# Patient Record
Sex: FEMALE | Race: WHITE | NOT HISPANIC OR LATINO | Employment: OTHER | URBAN - METROPOLITAN AREA
[De-identification: names, ages, dates, MRNs, and addresses within clinical notes are randomized per-mention and may not be internally consistent; named-entity substitution may affect disease eponyms.]

---

## 2017-04-12 ENCOUNTER — HOSPITAL ENCOUNTER (OUTPATIENT)
Dept: RADIOLOGY | Facility: MEDICAL CENTER | Age: 72
End: 2017-04-12
Attending: NURSE PRACTITIONER
Payer: MEDICARE

## 2017-04-12 DIAGNOSIS — J01.90 ACUTE SINUSITIS, UNSPECIFIED: ICD-10-CM

## 2017-04-12 DIAGNOSIS — R05.9 COUGH: ICD-10-CM

## 2017-04-12 PROCEDURE — 70220 X-RAY EXAM OF SINUSES: CPT

## 2017-04-12 PROCEDURE — 71020 DX-CHEST-2 VIEWS: CPT

## 2017-09-11 ENCOUNTER — HOSPITAL ENCOUNTER (OUTPATIENT)
Facility: MEDICAL CENTER | Age: 72
End: 2017-09-12
Attending: EMERGENCY MEDICINE | Admitting: HOSPITALIST
Payer: MEDICARE

## 2017-09-11 ENCOUNTER — APPOINTMENT (OUTPATIENT)
Dept: RADIOLOGY | Facility: MEDICAL CENTER | Age: 72
End: 2017-09-11
Attending: EMERGENCY MEDICINE
Payer: MEDICARE

## 2017-09-11 ENCOUNTER — RESOLUTE PROFESSIONAL BILLING HOSPITAL PROF FEE (OUTPATIENT)
Dept: HOSPITALIST | Facility: MEDICAL CENTER | Age: 72
End: 2017-09-11
Payer: MEDICARE

## 2017-09-11 DIAGNOSIS — R61 DIAPHORESIS: ICD-10-CM

## 2017-09-11 DIAGNOSIS — R11.2 NAUSEA AND VOMITING, INTRACTABILITY OF VOMITING NOT SPECIFIED, UNSPECIFIED VOMITING TYPE: ICD-10-CM

## 2017-09-11 PROBLEM — N18.30 CKD (CHRONIC KIDNEY DISEASE) STAGE 3, GFR 30-59 ML/MIN (HCC): Status: ACTIVE | Noted: 2017-09-11

## 2017-09-11 PROBLEM — R11.10 EMESIS: Status: ACTIVE | Noted: 2017-09-11

## 2017-09-11 PROBLEM — M54.6 LEFT-SIDED THORACIC BACK PAIN: Status: ACTIVE | Noted: 2017-09-11

## 2017-09-11 PROBLEM — G47.00 INSOMNIA: Status: ACTIVE | Noted: 2017-09-11

## 2017-09-11 LAB
ALBUMIN SERPL BCP-MCNC: 4.4 G/DL (ref 3.2–4.9)
ALBUMIN/GLOB SERPL: 1.7 G/DL
ALP SERPL-CCNC: 62 U/L (ref 30–99)
ALT SERPL-CCNC: 12 U/L (ref 2–50)
ANION GAP SERPL CALC-SCNC: 12 MMOL/L (ref 0–11.9)
APPEARANCE UR: CLEAR
AST SERPL-CCNC: 18 U/L (ref 12–45)
BACTERIA #/AREA URNS HPF: ABNORMAL /HPF
BASOPHILS # BLD AUTO: 0.1 % (ref 0–1.8)
BASOPHILS # BLD: 0.01 K/UL (ref 0–0.12)
BILIRUB SERPL-MCNC: 0.8 MG/DL (ref 0.1–1.5)
BILIRUB UR QL STRIP.AUTO: NEGATIVE
BUN SERPL-MCNC: 28 MG/DL (ref 8–22)
CALCIUM SERPL-MCNC: 9.6 MG/DL (ref 8.4–10.2)
CHLORIDE SERPL-SCNC: 106 MMOL/L (ref 96–112)
CK SERPL-CCNC: 55 U/L (ref 0–154)
CO2 SERPL-SCNC: 20 MMOL/L (ref 20–33)
COLOR UR: YELLOW
CREAT SERPL-MCNC: 1.46 MG/DL (ref 0.5–1.4)
EKG IMPRESSION: NORMAL
EOSINOPHIL # BLD AUTO: 0.01 K/UL (ref 0–0.51)
EOSINOPHIL NFR BLD: 0.1 % (ref 0–6.9)
EPI CELLS #/AREA URNS HPF: ABNORMAL /HPF
ERYTHROCYTE [DISTWIDTH] IN BLOOD BY AUTOMATED COUNT: 43.5 FL (ref 35.9–50)
GFR SERPL CREATININE-BSD FRML MDRD: 35 ML/MIN/1.73 M 2
GLOBULIN SER CALC-MCNC: 2.6 G/DL (ref 1.9–3.5)
GLUCOSE SERPL-MCNC: 127 MG/DL (ref 65–99)
GLUCOSE UR STRIP.AUTO-MCNC: NEGATIVE MG/DL
HCT VFR BLD AUTO: 36.9 % (ref 37–47)
HGB BLD-MCNC: 13 G/DL (ref 12–16)
IMM GRANULOCYTES # BLD AUTO: 0.05 K/UL (ref 0–0.11)
IMM GRANULOCYTES NFR BLD AUTO: 0.5 % (ref 0–0.9)
KETONES UR STRIP.AUTO-MCNC: ABNORMAL MG/DL
LEUKOCYTE ESTERASE UR QL STRIP.AUTO: NEGATIVE
LIPASE SERPL-CCNC: 48 U/L (ref 7–58)
LYMPHOCYTES # BLD AUTO: 0.62 K/UL (ref 1–4.8)
LYMPHOCYTES NFR BLD: 6.6 % (ref 22–41)
MCH RBC QN AUTO: 30.6 PG (ref 27–33)
MCHC RBC AUTO-ENTMCNC: 35.2 G/DL (ref 33.6–35)
MCV RBC AUTO: 86.8 FL (ref 81.4–97.8)
MICRO URNS: ABNORMAL
MONOCYTES # BLD AUTO: 0.13 K/UL (ref 0–0.85)
MONOCYTES NFR BLD AUTO: 1.4 % (ref 0–13.4)
MUCOUS THREADS #/AREA URNS HPF: ABNORMAL /HPF
NEUTROPHILS # BLD AUTO: 8.56 K/UL (ref 2–7.15)
NEUTROPHILS NFR BLD: 91.3 % (ref 44–72)
NITRITE UR QL STRIP.AUTO: NEGATIVE
NRBC # BLD AUTO: 0 K/UL
NRBC BLD AUTO-RTO: 0 /100 WBC
PH UR STRIP.AUTO: 6 [PH]
PLATELET # BLD AUTO: 170 K/UL (ref 164–446)
PMV BLD AUTO: 11.1 FL (ref 9–12.9)
POTASSIUM SERPL-SCNC: 4.1 MMOL/L (ref 3.6–5.5)
PROT SERPL-MCNC: 7 G/DL (ref 6–8.2)
PROT UR QL STRIP: 100 MG/DL
RBC # BLD AUTO: 4.25 M/UL (ref 4.2–5.4)
RBC # URNS HPF: ABNORMAL /HPF
RBC UR QL AUTO: NEGATIVE
SODIUM SERPL-SCNC: 138 MMOL/L (ref 135–145)
SP GR UR STRIP.AUTO: 1.02
TROPONIN I SERPL-MCNC: <0.02 NG/ML (ref 0–0.04)
WBC # BLD AUTO: 9.4 K/UL (ref 4.8–10.8)
WBC #/AREA URNS HPF: ABNORMAL /HPF

## 2017-09-11 PROCEDURE — 93005 ELECTROCARDIOGRAM TRACING: CPT | Performed by: EMERGENCY MEDICINE

## 2017-09-11 PROCEDURE — 700105 HCHG RX REV CODE 258: Performed by: EMERGENCY MEDICINE

## 2017-09-11 PROCEDURE — G0378 HOSPITAL OBSERVATION PER HR: HCPCS

## 2017-09-11 PROCEDURE — 80053 COMPREHEN METABOLIC PANEL: CPT

## 2017-09-11 PROCEDURE — 85025 COMPLETE CBC W/AUTO DIFF WBC: CPT

## 2017-09-11 PROCEDURE — 93005 ELECTROCARDIOGRAM TRACING: CPT | Performed by: HOSPITALIST

## 2017-09-11 PROCEDURE — 93010 ELECTROCARDIOGRAM REPORT: CPT | Performed by: INTERNAL MEDICINE

## 2017-09-11 PROCEDURE — 700102 HCHG RX REV CODE 250 W/ 637 OVERRIDE(OP): Performed by: EMERGENCY MEDICINE

## 2017-09-11 PROCEDURE — 99285 EMERGENCY DEPT VISIT HI MDM: CPT

## 2017-09-11 PROCEDURE — 96361 HYDRATE IV INFUSION ADD-ON: CPT

## 2017-09-11 PROCEDURE — 700111 HCHG RX REV CODE 636 W/ 250 OVERRIDE (IP): Performed by: EMERGENCY MEDICINE

## 2017-09-11 PROCEDURE — 96374 THER/PROPH/DIAG INJ IV PUSH: CPT

## 2017-09-11 PROCEDURE — 81001 URINALYSIS AUTO W/SCOPE: CPT

## 2017-09-11 PROCEDURE — 82550 ASSAY OF CK (CPK): CPT

## 2017-09-11 PROCEDURE — 36415 COLL VENOUS BLD VENIPUNCTURE: CPT

## 2017-09-11 PROCEDURE — 83690 ASSAY OF LIPASE: CPT

## 2017-09-11 PROCEDURE — 87086 URINE CULTURE/COLONY COUNT: CPT

## 2017-09-11 PROCEDURE — 93005 ELECTROCARDIOGRAM TRACING: CPT

## 2017-09-11 PROCEDURE — 99220 PR INITIAL OBSERVATION CARE,LEVL III: CPT | Performed by: HOSPITALIST

## 2017-09-11 PROCEDURE — 71250 CT THORAX DX C-: CPT

## 2017-09-11 PROCEDURE — 700105 HCHG RX REV CODE 258: Performed by: HOSPITALIST

## 2017-09-11 PROCEDURE — 84484 ASSAY OF TROPONIN QUANT: CPT | Mod: 91

## 2017-09-11 PROCEDURE — A9270 NON-COVERED ITEM OR SERVICE: HCPCS | Performed by: EMERGENCY MEDICINE

## 2017-09-11 RX ORDER — OMEPRAZOLE 20 MG/1
20 CAPSULE, DELAYED RELEASE ORAL DAILY
Status: ON HOLD | COMMUNITY
End: 2017-09-12

## 2017-09-11 RX ORDER — AMOXICILLIN 250 MG
2 CAPSULE ORAL 2 TIMES DAILY
Status: DISCONTINUED | OUTPATIENT
Start: 2017-09-11 | End: 2017-09-12 | Stop reason: HOSPADM

## 2017-09-11 RX ORDER — NITROGLYCERIN 0.4 MG/1
0.4 TABLET SUBLINGUAL
Status: DISCONTINUED | OUTPATIENT
Start: 2017-09-11 | End: 2017-09-12 | Stop reason: HOSPADM

## 2017-09-11 RX ORDER — MEDROXYPROGESTERONE ACETATE 2.5 MG/1
2.5 TABLET ORAL DAILY
COMMUNITY

## 2017-09-11 RX ORDER — ASPIRIN 600 MG/1
300 SUPPOSITORY RECTAL DAILY
Status: DISCONTINUED | OUTPATIENT
Start: 2017-09-11 | End: 2017-09-12 | Stop reason: HOSPADM

## 2017-09-11 RX ORDER — ASPIRIN 600 MG/1
300 SUPPOSITORY RECTAL ONCE
Status: COMPLETED | OUTPATIENT
Start: 2017-09-11 | End: 2017-09-11

## 2017-09-11 RX ORDER — LOSARTAN POTASSIUM 25 MG/1
100 TABLET ORAL DAILY
Status: DISCONTINUED | OUTPATIENT
Start: 2017-09-12 | End: 2017-09-12 | Stop reason: HOSPADM

## 2017-09-11 RX ORDER — ASPIRIN 81 MG/1
324 TABLET, CHEWABLE ORAL DAILY
Status: DISCONTINUED | OUTPATIENT
Start: 2017-09-11 | End: 2017-09-12 | Stop reason: HOSPADM

## 2017-09-11 RX ORDER — LORAZEPAM 1 MG/1
0.5 TABLET ORAL EVERY 6 HOURS PRN
Status: DISCONTINUED | OUTPATIENT
Start: 2017-09-11 | End: 2017-09-12 | Stop reason: HOSPADM

## 2017-09-11 RX ORDER — ESTRADIOL 0.5 MG/1
0.5 TABLET ORAL DAILY
COMMUNITY

## 2017-09-11 RX ORDER — ASPIRIN 81 MG/1
324 TABLET, CHEWABLE ORAL ONCE
Status: COMPLETED | OUTPATIENT
Start: 2017-09-11 | End: 2017-09-11

## 2017-09-11 RX ORDER — ONDANSETRON 2 MG/ML
4 INJECTION INTRAMUSCULAR; INTRAVENOUS ONCE
Status: COMPLETED | OUTPATIENT
Start: 2017-09-11 | End: 2017-09-11

## 2017-09-11 RX ORDER — LORAZEPAM 2 MG/ML
0.5 INJECTION INTRAMUSCULAR EVERY 6 HOURS PRN
Status: DISCONTINUED | OUTPATIENT
Start: 2017-09-11 | End: 2017-09-12 | Stop reason: HOSPADM

## 2017-09-11 RX ORDER — BISACODYL 10 MG
10 SUPPOSITORY, RECTAL RECTAL
Status: DISCONTINUED | OUTPATIENT
Start: 2017-09-11 | End: 2017-09-12 | Stop reason: HOSPADM

## 2017-09-11 RX ORDER — SODIUM CHLORIDE 9 MG/ML
INJECTION, SOLUTION INTRAVENOUS CONTINUOUS
Status: DISCONTINUED | OUTPATIENT
Start: 2017-09-11 | End: 2017-09-12 | Stop reason: HOSPADM

## 2017-09-11 RX ORDER — POLYETHYLENE GLYCOL 3350 17 G/17G
1 POWDER, FOR SOLUTION ORAL
Status: DISCONTINUED | OUTPATIENT
Start: 2017-09-11 | End: 2017-09-12 | Stop reason: HOSPADM

## 2017-09-11 RX ORDER — SODIUM CHLORIDE 9 MG/ML
1000 INJECTION, SOLUTION INTRAVENOUS ONCE
Status: COMPLETED | OUTPATIENT
Start: 2017-09-11 | End: 2017-09-11

## 2017-09-11 RX ORDER — ONDANSETRON 2 MG/ML
4 INJECTION INTRAMUSCULAR; INTRAVENOUS EVERY 4 HOURS PRN
Status: DISCONTINUED | OUTPATIENT
Start: 2017-09-11 | End: 2017-09-12 | Stop reason: HOSPADM

## 2017-09-11 RX ORDER — OXYCODONE AND ACETAMINOPHEN 10; 325 MG/1; MG/1
1-2 TABLET ORAL EVERY 4 HOURS PRN
COMMUNITY

## 2017-09-11 RX ORDER — ACETAMINOPHEN 325 MG/1
650 TABLET ORAL EVERY 6 HOURS PRN
Status: DISCONTINUED | OUTPATIENT
Start: 2017-09-11 | End: 2017-09-12 | Stop reason: HOSPADM

## 2017-09-11 RX ORDER — ASPIRIN 325 MG
325 TABLET ORAL DAILY
Status: DISCONTINUED | OUTPATIENT
Start: 2017-09-11 | End: 2017-09-12 | Stop reason: HOSPADM

## 2017-09-11 RX ORDER — ONDANSETRON 4 MG/1
4 TABLET, ORALLY DISINTEGRATING ORAL EVERY 4 HOURS PRN
Status: DISCONTINUED | OUTPATIENT
Start: 2017-09-11 | End: 2017-09-12 | Stop reason: HOSPADM

## 2017-09-11 RX ORDER — HEPARIN SODIUM 5000 [USP'U]/ML
5000 INJECTION, SOLUTION INTRAVENOUS; SUBCUTANEOUS EVERY 8 HOURS
Status: DISCONTINUED | OUTPATIENT
Start: 2017-09-11 | End: 2017-09-12 | Stop reason: HOSPADM

## 2017-09-11 RX ORDER — AMLODIPINE BESYLATE 5 MG/1
5 TABLET ORAL DAILY
Status: DISCONTINUED | OUTPATIENT
Start: 2017-09-11 | End: 2017-09-12 | Stop reason: HOSPADM

## 2017-09-11 RX ADMIN — SODIUM CHLORIDE 1000 ML: 9 INJECTION, SOLUTION INTRAVENOUS at 16:44

## 2017-09-11 RX ADMIN — SODIUM CHLORIDE: 9 INJECTION, SOLUTION INTRAVENOUS at 20:35

## 2017-09-11 RX ADMIN — ASPIRIN 81 MG 324 MG: 81 TABLET ORAL at 16:43

## 2017-09-11 RX ADMIN — ONDANSETRON 4 MG: 2 INJECTION INTRAMUSCULAR; INTRAVENOUS at 16:43

## 2017-09-11 ASSESSMENT — PATIENT HEALTH QUESTIONNAIRE - PHQ9
SUM OF ALL RESPONSES TO PHQ9 QUESTIONS 1 AND 2: 0
SUM OF ALL RESPONSES TO PHQ QUESTIONS 1-9: 0
1. LITTLE INTEREST OR PLEASURE IN DOING THINGS: NOT AT ALL
2. FEELING DOWN, DEPRESSED, IRRITABLE, OR HOPELESS: NOT AT ALL

## 2017-09-11 ASSESSMENT — PAIN SCALES - GENERAL: PAINLEVEL_OUTOF10: 4

## 2017-09-11 ASSESSMENT — ENCOUNTER SYMPTOMS
NAUSEA: 1
SENSORY CHANGE: 0
CONSTIPATION: 0
CHILLS: 0
BRUISES/BLEEDS EASILY: 0
LOSS OF CONSCIOUSNESS: 0
CLAUDICATION: 0
EYE DISCHARGE: 0
EYE PAIN: 0
DEPRESSION: 0
DIARRHEA: 0
FOCAL WEAKNESS: 0
HEMOPTYSIS: 0
SORE THROAT: 0
MYALGIAS: 0
HEADACHES: 0
VOMITING: 1
DIAPHORESIS: 0
SPEECH CHANGE: 0
FEVER: 0
DIZZINESS: 0
SHORTNESS OF BREATH: 0
BACK PAIN: 1
COUGH: 0
WHEEZING: 0
PALPITATIONS: 0
NECK PAIN: 0
SPUTUM PRODUCTION: 0
WEAKNESS: 0
ABDOMINAL PAIN: 0

## 2017-09-11 ASSESSMENT — LIFESTYLE VARIABLES
DO YOU DRINK ALCOHOL: NO
EVER_SMOKED: NEVER
SUBSTANCE_ABUSE: 0

## 2017-09-11 NOTE — ED NOTES
"Chief Complaint   Patient presents with   • N/V     Pt c/o n/v that began this am. Pt states she took an over the counter sleep aid to help with insomnia last night for the first time and woke up feeling very nausous this morning.    • Shoulder Pain     Pt c/o bilateral pain that begins in neck and radiates down arm. Pt states this began this am. Pt denies trauma.        /79   Pulse 68   Temp 36.2 °C (97.2 °F)   Resp 18   Ht 1.626 m (5' 4\")   Wt 78.6 kg (173 lb 4.5 oz)   SpO2 97%   BMI 29.74 kg/m²     "

## 2017-09-11 NOTE — ED PROVIDER NOTES
ED Provider Note    CHIEF COMPLAINT  Chief Complaint   Patient presents with   • N/V     Pt c/o n/v that began this am. Pt states she took an over the counter sleep aid to help with insomnia last night for the first time and woke up feeling very nausous this morning.    • Shoulder Pain     Pt c/o bilateral pain that begins in neck and radiates down arm. Pt states this began this am. Pt denies trauma.        HPI  Berta Cohen is a 71 y.o. female with a h/o HTN and GERD who presents complaining of Nausea, vomiting, diaphoresis.    Patient states she been having trouble sleeping and took an over-the-counter sleep medication yesterday evening at 9 PM. At midnight and she woke with nausea. She awoke this morning after not sleeping well and loaded up on chicken noodle soup. At 9:30 she had increased nausea after experiencing acute onset of profuse diaphoresis and then began vomiting for several hours. She denies hematemesis or bilious emesis. She also noted soreness in her bilateral knees. She took a prednisone for this. While vomiting she experienced pain in her bilateral upper arms that was achy and radiated into her posterior neck area. This was not a tearing pain. She denies associated shortness of breath, chest pain, weakness, numbness, calf pain, leg swelling, abdominal pain.      ALLERGIES  Allergies   Allergen Reactions   • Neosporin [Neomycin-Polymyxin B]      Per pt cant use for more than 5 days or gets red rash.   • Statins [Hmg-Coa-R Inhibitors]        CURRENT MEDICATIONS  Home Medications     Reviewed by Gaby Bishop (Pharmacy Tech) on 09/11/17 at 1533  Med List Status: Complete   Medication Last Dose Status   amlodipine (NORVASC) 5 MG TABS 9/11/2017 Active   CALCIUM PO 9/10/2017 Active   estradiol (ESTRACE) 0.5 MG tablet 9/10/2017 Active   Flax Oil 9/10/2017 Active   losartan (COZAAR) 100 MG TABS 9/11/2017 Active   medroxyPROGESTERone (PROVERA) 2.5 MG Tab 9/10/2017 Active   Misc Natural  "Products (TURMERIC CURCUMIN) Cap 9/10/2017 Active   oxycodone-acetaminophen (PERCOCET-10)  MG Tab 9/11/2017 Active   vitamin D (CHOLECALCIFEROL) 1000 UNIT Tab 9/10/2017 Active                PAST MEDICAL HISTORY   has a past medical history of Arthritis.    SURGICAL HISTORY   has a past surgical history that includes other abdominal surgery; gyn surgery; and other orthopedic surgery (2014).    SOCIAL HISTORY  Social History     Social History Main Topics   • Smoking status: Never Smoker   • Smokeless tobacco: Never Used   • Alcohol use Yes      Comment: rare   • Drug use: No   • Sexual activity: Not on file       Family Hx:  Father with thoracic aortic dissection  No history of PE/DVT      REVIEW OF SYSTEMS  See HPI for further details.  All other systems are negative except as above in HPI.    PHYSICAL EXAM  VITAL SIGNS: /82   Pulse 77   Temp 36.2 °C (97.2 °F)   Resp (!) 30   Ht 1.626 m (5' 4\")   Wt 78.6 kg (173 lb 4.5 oz)   SpO2 97%   BMI 29.74 kg/m²     General:  WDWN, nontoxic appearing, Slightly anxious, talkative; A+Ox3; V/S as above   Skin: warm and dry; good color; no rash  HEENT: NCAT; EOMs intact; PERRL; no scleral icterus; Dry mouth  Neck: FROM; no meningismus, no LAD  Cardiovascular: Regular heart rate and rhythm.  No murmurs, rubs, or gallops; pulses 2+ bilaterally radially and DP areas; no chest wall tenderness  Lungs: Clear to auscultation with good air movement bilaterally.  No wheezes, rhonchi, or rales.   Abdomen: BS present; soft; NTND; no rebound, guarding, or rigidity.  No organomegaly or pulsatile mass  Extremities: JENSEN x 4; no e/o trauma; no pedal edema; neg Nicolle's  Neurologic: CNs III-XII grossly intact; speech clear; distal sensation intact; strength 5/5 UE/LEs  Psychiatric: Appropriate affect, normal mood    LABS  Results for orders placed or performed during the hospital encounter of 09/11/17   CBC WITH DIFFERENTIAL   Result Value Ref Range    WBC 9.4 4.8 - 10.8 K/uL    " RBC 4.25 4.20 - 5.40 M/uL    Hemoglobin 13.0 12.0 - 16.0 g/dL    Hematocrit 36.9 (L) 37.0 - 47.0 %    MCV 86.8 81.4 - 97.8 fL    MCH 30.6 27.0 - 33.0 pg    MCHC 35.2 (H) 33.6 - 35.0 g/dL    RDW 43.5 35.9 - 50.0 fL    Platelet Count 170 164 - 446 K/uL    MPV 11.1 9.0 - 12.9 fL    Neutrophils-Polys 91.30 (H) 44.00 - 72.00 %    Lymphocytes 6.60 (L) 22.00 - 41.00 %    Monocytes 1.40 0.00 - 13.40 %    Eosinophils 0.10 0.00 - 6.90 %    Basophils 0.10 0.00 - 1.80 %    Immature Granulocytes 0.50 0.00 - 0.90 %    Nucleated RBC 0.00 /100 WBC    Neutrophils (Absolute) 8.56 (H) 2.00 - 7.15 K/uL    Lymphs (Absolute) 0.62 (L) 1.00 - 4.80 K/uL    Monos (Absolute) 0.13 0.00 - 0.85 K/uL    Eos (Absolute) 0.01 0.00 - 0.51 K/uL    Baso (Absolute) 0.01 0.00 - 0.12 K/uL    Immature Granulocytes (abs) 0.05 0.00 - 0.11 K/uL    NRBC (Absolute) 0.00 K/uL   CMP   Result Value Ref Range    Sodium 138 135 - 145 mmol/L    Potassium 4.1 3.6 - 5.5 mmol/L    Chloride 106 96 - 112 mmol/L    Co2 20 20 - 33 mmol/L    Anion Gap 12.0 (H) 0.0 - 11.9    Glucose 127 (H) 65 - 99 mg/dL    Bun 28 (H) 8 - 22 mg/dL    Creatinine 1.46 (H) 0.50 - 1.40 mg/dL    Calcium 9.6 8.4 - 10.2 mg/dL    AST(SGOT) 18 12 - 45 U/L    ALT(SGPT) 12 2 - 50 U/L    Alkaline Phosphatase 62 30 - 99 U/L    Total Bilirubin 0.8 0.1 - 1.5 mg/dL    Albumin 4.4 3.2 - 4.9 g/dL    Total Protein 7.0 6.0 - 8.2 g/dL    Globulin 2.6 1.9 - 3.5 g/dL    A-G Ratio 1.7 g/dL   TROPONIN   Result Value Ref Range    Troponin I <0.02 0.00 - 0.04 ng/mL   LIPASE   Result Value Ref Range    Lipase 48 7 - 58 U/L   CREATINE KINASE   Result Value Ref Range    CPK Total 55 0 - 154 U/L   ESTIMATED GFR   Result Value Ref Range    GFR If  42 (A) >60 mL/min/1.73 m 2    GFR If Non African American 35 (A) >60 mL/min/1.73 m 2   EKG (NOW)   Result Value Ref Range    Report       [facility] Emergency Dept.    Test Date:  2017-09-11  Pt Name:    RAKESH ESQUEDA               Department: [facility]  MRN:        [id number]                      Room:  Gender:     F                            Technician: PAOLA  :        [id number]                   Requested By:ER TRIAGE PROTOCOL  Order #:    [id number]                    Reading MD: JAVED ARTIS MD    Measurements  Intervals                                Axis  Rate:       65                           P:          62  MA:         148                          QRS:        14  QRSD:       88                           T:          20  QT:         424  QTc:        441    Interpretive Statements  SINUS RHYTHM  CONSIDER LEFT ATRIAL ABNORMALITY  Compared to ECG 07/15/2014 16:57:20  No significant changes    Electronically Signed On 2017 16:36:02 PDT by JAVED ARTIS MD           EKG  12 Lead EKG obtained at 1403 and interpreted by me to show:  Rhythm: Sinus rhythm   Rate: 65  Intervals:   MA: 148  QRS: 88  QTC: 441  All intervals are within normal limits  No ectopy  Normal axis  No ST changes  T-wave flattening in 3 and aVF  Clinical Impression: Sinus rhythm with no acute ST changes with nonspecific T-wave changes inferiorly  Compared to previous EKG dated 7/15/14 which shows no change  EKG has been confirmed in [facility]       IMAGING  CT-CHEST (THORAX) W/O   Final Result      1.3 x 0.9 cm groundglass opacity in the left lower lobe may be infectious or inflammatory but an additional process such as bronchoalveolar carcinoma in situ is not excluded. Follow-up is recommended.      Atherosclerotic plaque.      3.5 cm right thyroid nodule with ultrasound is recommended.      Sclerotic focus within the T12 vertebral body may represent a bone island but metastatic disease is not excluded. Further evaluation can be obtained with nuclear medicine bone scan.      Parapelvic cysts.      1.2 cm right renal lesion may represent hemorrhagic/proteinaceous cyst but a solid mass is not excluded. Further evaluation can be obtained with renal protocol MRI.       Small hiatal hernia.         Ground Glass: CT at 6-12 months to confirm persistence, then CT every 2 years until 5 years      Part Solid: CT at 3-6 months to confirm persistence. If unchanged and solid component remains less than 6 mm, annual CT should be performed for 5 years.      Comments: In practice, part-solid nodules cannot be defined as such until equal to or greater than 6 mm, and nodules less than 6 mm do not usually require follow-up. Persistent part-solid nodules with solid components equal to or greater than 6 mm should    be considered highly suspicious.      Note: These recommendations do not apply to lung cancer screening, patients with immunosuppression, or patients with known primary cancer.      Fleischner Society 2017 Guidelines for Management of Incidentally Detected Pulmonary Nodules in Adults          MEDICAL RECORD  I have reviewed patient's medical record and pertinent results are listed below.      COURSE & MEDICAL DECISION MAKING  I have reviewed any medical record information, laboratory studies and radiographic results as noted.    Berta Cohen is a 71 y.o. female who presents complaining of bilateral arm pain, diaphoresis, nausea. Patient has a family history of a thoracic aortic dissection.  Patient has no current symptoms at this time. I do not suspect PE.    EKG was obtained and demonstrated no acute ST segment elevation    Blood pressures were obtained bilaterally and found to be 141 versus 150 systolic    Aspirin 325 mg was given here    Normal saline ×1 L bolus was ordered as patient reported several hours of vomiting this morning. She is likely dehydrated and does have a dry mouth on exam.    Labs demonstrate renal insufficiency with a creatinine of 1.46, anion gap of 12, glucose 127, BUN of 28 indicating mild dehydration. Troponin is negative.    CT scan without contrast was obtained given patient's history of chronic kidney disease and no obvious aneurysm was  detected.    5:35 PM  I discussed the case with Dr. Michael from the hospitalist service who agrees to the patient for further observation and testing.    FINAL IMPRESSION  1. Diaphoresis    2. Nausea and vomiting, intractability of vomiting not specified, unspecified vomiting type             Electronically signed by: Tete Garcia, 9/11/2017 3:22 PM

## 2017-09-11 NOTE — ED NOTES
Orders recvd with pt-saline lock with blood draw, aware of need for urine sample. Denies further needs

## 2017-09-12 ENCOUNTER — APPOINTMENT (OUTPATIENT)
Dept: RADIOLOGY | Facility: MEDICAL CENTER | Age: 72
End: 2017-09-12
Attending: HOSPITALIST
Payer: MEDICARE

## 2017-09-12 VITALS
RESPIRATION RATE: 18 BRPM | SYSTOLIC BLOOD PRESSURE: 134 MMHG | TEMPERATURE: 97.7 F | BODY MASS INDEX: 29.58 KG/M2 | HEART RATE: 74 BPM | OXYGEN SATURATION: 94 % | HEIGHT: 64 IN | DIASTOLIC BLOOD PRESSURE: 60 MMHG | WEIGHT: 173.28 LBS

## 2017-09-12 LAB
ANION GAP SERPL CALC-SCNC: 6 MMOL/L (ref 0–11.9)
BASOPHILS # BLD AUTO: 0.4 % (ref 0–1.8)
BASOPHILS # BLD: 0.03 K/UL (ref 0–0.12)
BUN SERPL-MCNC: 24 MG/DL (ref 8–22)
CALCIUM SERPL-MCNC: 8.8 MG/DL (ref 8.4–10.2)
CHLORIDE SERPL-SCNC: 111 MMOL/L (ref 96–112)
CHOLEST SERPL-MCNC: 175 MG/DL (ref 100–199)
CO2 SERPL-SCNC: 21 MMOL/L (ref 20–33)
CREAT SERPL-MCNC: 1.43 MG/DL (ref 0.5–1.4)
EKG IMPRESSION: NORMAL
EOSINOPHIL # BLD AUTO: 0.17 K/UL (ref 0–0.51)
EOSINOPHIL NFR BLD: 2.4 % (ref 0–6.9)
ERYTHROCYTE [DISTWIDTH] IN BLOOD BY AUTOMATED COUNT: 46.6 FL (ref 35.9–50)
GFR SERPL CREATININE-BSD FRML MDRD: 36 ML/MIN/1.73 M 2
GLUCOSE SERPL-MCNC: 92 MG/DL (ref 65–99)
HCT VFR BLD AUTO: 32.2 % (ref 37–47)
HDLC SERPL-MCNC: 38 MG/DL
HGB BLD-MCNC: 10.9 G/DL (ref 12–16)
IMM GRANULOCYTES # BLD AUTO: 0.03 K/UL (ref 0–0.11)
IMM GRANULOCYTES NFR BLD AUTO: 0.4 % (ref 0–0.9)
LDLC SERPL CALC-MCNC: 105 MG/DL
LYMPHOCYTES # BLD AUTO: 1.71 K/UL (ref 1–4.8)
LYMPHOCYTES NFR BLD: 24.1 % (ref 22–41)
MAGNESIUM SERPL-MCNC: 1.9 MG/DL (ref 1.5–2.5)
MCH RBC QN AUTO: 31 PG (ref 27–33)
MCHC RBC AUTO-ENTMCNC: 33.9 G/DL (ref 33.6–35)
MCV RBC AUTO: 91.5 FL (ref 81.4–97.8)
MONOCYTES # BLD AUTO: 0.74 K/UL (ref 0–0.85)
MONOCYTES NFR BLD AUTO: 10.4 % (ref 0–13.4)
NEUTROPHILS # BLD AUTO: 4.42 K/UL (ref 2–7.15)
NEUTROPHILS NFR BLD: 62.3 % (ref 44–72)
NRBC # BLD AUTO: 0 K/UL
NRBC BLD AUTO-RTO: 0 /100 WBC
PLATELET # BLD AUTO: 162 K/UL (ref 164–446)
PMV BLD AUTO: 11 FL (ref 9–12.9)
POTASSIUM SERPL-SCNC: 4.2 MMOL/L (ref 3.6–5.5)
RBC # BLD AUTO: 3.52 M/UL (ref 4.2–5.4)
SODIUM SERPL-SCNC: 138 MMOL/L (ref 135–145)
TRIGL SERPL-MCNC: 162 MG/DL (ref 0–149)
TROPONIN I SERPL-MCNC: <0.02 NG/ML (ref 0–0.04)
TROPONIN I SERPL-MCNC: <0.02 NG/ML (ref 0–0.04)
TSH SERPL DL<=0.005 MIU/L-ACNC: 1.54 UIU/ML (ref 0.35–5.5)
WBC # BLD AUTO: 7.1 K/UL (ref 4.8–10.8)

## 2017-09-12 PROCEDURE — 80048 BASIC METABOLIC PNL TOTAL CA: CPT

## 2017-09-12 PROCEDURE — 700102 HCHG RX REV CODE 250 W/ 637 OVERRIDE(OP): Performed by: HOSPITALIST

## 2017-09-12 PROCEDURE — 99217 PR OBSERVATION CARE DISCHARGE: CPT | Performed by: FAMILY MEDICINE

## 2017-09-12 PROCEDURE — A9270 NON-COVERED ITEM OR SERVICE: HCPCS | Performed by: HOSPITALIST

## 2017-09-12 PROCEDURE — 83735 ASSAY OF MAGNESIUM: CPT

## 2017-09-12 PROCEDURE — 84484 ASSAY OF TROPONIN QUANT: CPT

## 2017-09-12 PROCEDURE — 700105 HCHG RX REV CODE 258: Performed by: HOSPITALIST

## 2017-09-12 PROCEDURE — 84443 ASSAY THYROID STIM HORMONE: CPT

## 2017-09-12 PROCEDURE — G0378 HOSPITAL OBSERVATION PER HR: HCPCS

## 2017-09-12 PROCEDURE — 700111 HCHG RX REV CODE 636 W/ 250 OVERRIDE (IP)

## 2017-09-12 PROCEDURE — 80061 LIPID PANEL: CPT

## 2017-09-12 PROCEDURE — A9502 TC99M TETROFOSMIN: HCPCS

## 2017-09-12 PROCEDURE — 85025 COMPLETE CBC W/AUTO DIFF WBC: CPT

## 2017-09-12 RX ORDER — OMEPRAZOLE 20 MG/1
40 CAPSULE, DELAYED RELEASE ORAL DAILY
Qty: 30 CAP | Refills: 0 | Status: SHIPPED | OUTPATIENT
Start: 2017-09-12

## 2017-09-12 RX ORDER — REGADENOSON 0.08 MG/ML
INJECTION, SOLUTION INTRAVENOUS
Status: COMPLETED
Start: 2017-09-12 | End: 2017-09-12

## 2017-09-12 RX ADMIN — VITAMIN D, TAB 1000IU (100/BT) 1000 UNITS: 25 TAB at 09:54

## 2017-09-12 RX ADMIN — SODIUM CHLORIDE: 9 INJECTION, SOLUTION INTRAVENOUS at 06:09

## 2017-09-12 RX ADMIN — REGADENOSON 0.4 MG: 0.08 INJECTION, SOLUTION INTRAVENOUS at 08:40

## 2017-09-12 RX ADMIN — AMLODIPINE BESYLATE 5 MG: 5 TABLET ORAL at 09:54

## 2017-09-12 RX ADMIN — LOSARTAN POTASSIUM 100 MG: 25 TABLET, FILM COATED ORAL at 09:53

## 2017-09-12 RX ADMIN — ASPIRIN 325 MG ORAL TABLET 325 MG: 325 PILL ORAL at 09:53

## 2017-09-12 NOTE — ASSESSMENT & PLAN NOTE
CTA of the aorta did not reveal any dissection however if there are several small lesions that will need follow-up as an outpatient. Including a left lower lobe opacity. Thyroid nodule.  Patient did appear to have chest pain equivalent with nausea vomiting as well as radiation into her bilateral shoulders and especially down her left arm. I have ordered a nuclear medicine stress test for the a.m. Her last stress test was in 2013 normal. Her last echocardiogram 2011 was normal.  EKG sinus rhythm rate of 65 no ST elevations or depressions. Patient is taking estrogen and progesterone. I have held these medications for now. May want to discontinue in the future since it is a risk for cardiac disease.

## 2017-09-12 NOTE — DISCHARGE INSTRUCTIONS
Discharge Instructions    Discharged to home by car with relative. Discharged via wheelchair, hospital escort: Yes.  Special equipment needed: Not Applicable    Be sure to schedule a follow-up appointment with your primary care doctor or any specialists as instructed.     Discharge Plan:   Diet Plan: Discussed  Activity Level: Discussed  Confirmed Follow up Appointment: Appointment Scheduled  Confirmed Symptoms Management: Discussed  Medication Reconciliation Updated: Yes  Influenza Vaccine Indication: Indicated: 9 to 64 years of age (pt wants to wait til discharge)    I understand that a diet low in cholesterol, fat, and sodium is recommended for good health. Unless I have been given specific instructions below for another diet, I accept this instruction as my diet prescription.   Other diet: Regular, as tolerated    Special Instructions: None    · Is patient discharged on Warfarin / Coumadin?   No     · Is patient Post Blood Transfusion?  No    Chest Pain, Nonspecific  It is often hard to give a specific diagnosis for the cause of chest pain. There is always a chance that your pain could be related to something serious, like a heart attack or a blood clot in the lungs. You need to follow up with your caregiver for further evaluation. More lab tests or other studies such as X-rays, electrocardiography, stress testing, or cardiac imaging may be needed to find the cause of your pain.  Most of the time, nonspecific chest pain improves within 2 to 3 days with rest and mild pain medicine. For the next few days, avoid physical exertion or activities that bring on pain. Do not smoke. Avoid drinking alcohol. Call your caregiver for routine follow-up as advised.   SEEK IMMEDIATE MEDICAL CARE IF:  · You develop increased chest pain or pain that radiates to the arm, neck, jaw, back, or abdomen.   · You develop shortness of breath, increased coughing, or you start coughing up blood.   · You have severe back or abdominal pain,  nausea, or vomiting.   · You develop severe weakness, fainting, fever, or chills.   Document Released: 12/18/2006 Document Revised: 03/11/2013 Document Reviewed: 06/06/2008  ExitCare® Patient Information ©2013 [employer].      Gastroesophageal Reflux Disease, Adult  Gastroesophageal reflux disease (GERD) happens when acid from your stomach goes into your food pipe (esophagus). The acid can cause a burning feeling in your chest. Over time, the acid can make small holes (ulcers) in your food pipe.   HOME CARE  · Ask your doctor for advice about:  ¨ Losing weight.  ¨ Quitting smoking.  ¨ Alcohol use.  · Avoid foods and drinks that make your problems worse. You may want to avoid:  ¨ Caffeine and alcohol.  ¨ Chocolate.  ¨ Mints.  ¨ Garlic and onions.  ¨ Spicy foods.  ¨ Citrus fruits, such as oranges, gilson, or limes.  ¨ Foods that contain tomato, such as sauce, chili, salsa, and pizza.  ¨ Fried and fatty foods.  · Avoid lying down for 3 hours before you go to bed or before you take a nap.  · Eat small meals often, instead of large meals.  · Wear loose-fitting clothing. Do not wear anything tight around your waist.  · Raise (elevate) the head of your bed 6 to 8 inches with wood blocks. Using extra pillows does not help.  · Only take medicines as told by your doctor.  · Do not take aspirin or ibuprofen.  GET HELP RIGHT AWAY IF:   · You have pain in your arms, neck, jaw, teeth, or back.  · Your pain gets worse or changes.  · You feel sick to your stomach (nauseous), throw up (vomit), or sweat (diaphoresis).  · You feel short of breath, or you pass out (faint).  · Your throw up is green, yellow, black, or looks like coffee grounds or blood.  · Your poop (stool) is red, bloody, or black.  MAKE SURE YOU:   · Understand these instructions.  · Will watch your condition.  · Will get help right away if you are not doing well or get worse.     This information is not intended to replace advice given to you by your health care  provider. Make sure you discuss any questions you have with your health care provider.     Document Released: 06/05/2009 Document Revised: 03/11/2013 Document Reviewed: 04/13/2016  [employer] Interactive Patient Education ©2016 [employer] Inc.    Omeprazole tablets (OTC)  What is this medicine?  OMEPRAZOLE (oh ME pray zol) prevents the production of acid in the stomach. It is used to treat the symptoms of heartburn. You can buy this medicine without a prescription. This product is not for long-term use, unless otherwise directed by your doctor or health care professional.  This medicine may be used for other purposes; ask your health care provider or pharmacist if you have questions.  COMMON BRAND NAME(S): Prilosec OTC  What should I tell my health care provider before I take this medicine?  They need to know if you have any of these conditions:  -black or bloody stools  -chest pain  -difficulty swallowing  -have had heartburn for over 3 months  -have heartburn with dizziness, lightheadedness or sweating  -liver disease  -stomach pain  -unexplained weight loss  -vomiting with blood  -wheezing  -an unusual or allergic reaction to omeprazole, other medicines, foods, dyes, or preservatives  -pregnant or trying to get pregnant  -breast-feeding  How should I use this medicine?  Take this medicine by mouth. Follow the directions on the product label. If you are taking this medicine without a prescription, take one tablet every day. Do not use for longer than 14 days or repeat a course of treatment more often than every 4 months unless directed by a doctor or healthcare professional. Take your dose at regular intervals every 24 hours. Swallow the tablet whole with a drink of water. Do not crush, break or chew. This medicine works best if taken on an empty stomach 30 minutes before breakfast. If you are using this medicine with the prescription of your doctor or healthcare professional, follow the directions you were given. Do  not take your medicine more often than directed.  Talk to your pediatrician regarding the use of this medicine in children. Special care may be needed.  Overdosage: If you think you have taken too much of this medicine contact a poison control center or emergency room at once.  NOTE: This medicine is only for you. Do not share this medicine with others.  What if I miss a dose?  If you miss a dose, take it as soon as you can. If it is almost time for your next dose, take only that dose. Do not take double or extra doses.  What may interact with this medicine?  Do not take this medicine with any of the following medications:  -atazanavir  -clopidogrel  -nelfinavir  This medicine may also interact with the following medications:  -ampicillin  -certain medicines for anxiety or sleep  -certain medicines that treat or prevent blood clots like warfarin  -cyclosporine  -diazepam  -digoxin  -disulfiram  -iron salts  -phenytoin  -prescription medicine for fungal or yeast infection like itraconazole, ketoconazole, voriconazole  -saquinavir  -tacrolimus  This list may not describe all possible interactions. Give your health care provider a list of all the medicines, herbs, non-prescription drugs, or dietary supplements you use. Also tell them if you smoke, drink alcohol, or use illegal drugs. Some items may interact with your medicine.  What should I watch for while using this medicine?  It can take several days before your heartburn gets better. Check with your doctor or health care professional if your condition does not start to get better, or if it gets worse.  Do not treat diarrhea with over the counter products. Contact your doctor if you have diarrhea that lasts more than 2 days or if it is severe and watery.  Do not treat yourself for heartburn with this medicine for more than 14 days in a row. You should only use this medicine for a 2-week treatment period once every 4 months. If your symptoms return shortly after your  therapy is complete, or within the 4 month time frame, call your doctor or health care professional.  What side effects may I notice from receiving this medicine?  Side effects that you should report to your doctor or health care professional as soon as possible:  -allergic reactions like skin rash, itching or hives, swelling of the face, lips, or tongue  -bone, muscle or joint pain  -breathing problems  -chest pain or chest tightness  -dark yellow or brown urine  -diarrhea  -dizziness  -fast, irregular heartbeat  -feeling faint or lightheaded  -fever or sore throat  -muscle spasm  -palpitations  -redness, blistering, peeling or loosening of the skin, including inside the mouth  -seizures  -tremors  -unusual bleeding or bruising  -unusually weak or tired  -yellowing of the eyes or skin  Side effects that usually do not require medical attention (Report these to your doctor or health care professional if they continue or are bothersome.):  -constipation  -dry mouth  -headache  -loose stools  -nausea  This list may not describe all possible side effects. Call your doctor for medical advice about side effects. You may report side effects to FDA at 7-141-FDA-7297.  Where should I keep my medicine?  Keep out of the reach of children.  Store at room temperature between 20 and 25 degrees C (68 and 77 degrees F). Protect from light and moisture. Throw away any unused medicine after the expiration date.  NOTE: This sheet is a summary. It may not cover all possible information. If you have questions about this medicine, talk to your doctor, pharmacist, or health care provider.  © 2014, Elsevier/Gold Standard. (9/17/2012 11:40:25 AM)    Depression / Suicide Risk    As you are discharged from this [facility] facility, it is important to learn how to keep safe from harming yourself.    Recognize the warning signs:  · Abrupt changes in personality, positive or negative- including increase in energy   · Giving away  possessions  · Change in eating patterns- significant weight changes-  positive or negative  · Change in sleeping patterns- unable to sleep or sleeping all the time   · Unwillingness or inability to communicate  · Depression  · Unusual sadness, discouragement and loneliness  · Talk of wanting to die  · Neglect of personal appearance   · Rebelliousness- reckless behavior  · Withdrawal from people/activities they love  · Confusion- inability to concentrate     If you or a loved one observes any of these behaviors or has concerns about self-harm, here's what you can do:  · Talk about it- your feelings and reasons for harming yourself  · Remove any means that you might use to hurt yourself (examples: pills, rope, extension cords, firearm)  · Get professional help from the community (Mental Health, Substance Abuse, psychological counseling)  · Do not be alone:Call your Safe Contact- someone whom you trust who will be there for you.  · Call your local CRISIS HOTLINE 006-1321 or 621-130-7981  · Call your local Children's Mobile Crisis Response Team [place] (957) 078-0531 or www.ZALORA  · Call the toll free National Suicide Prevention Hotlines   · National Suicide Prevention Lifeline 872-106-OSNO (2149)  · National Hope Line Network 800-SUICIDE (002-3641)

## 2017-09-12 NOTE — ASSESSMENT & PLAN NOTE
Persistent insomnia. Patient does appear to be anxious did suggest some Ativan to see if this would help with sleep. Have also suggested some relaxation techniques that can be looked up online.

## 2017-09-12 NOTE — ASSESSMENT & PLAN NOTE
Sudden episode of nausea vomiting persistent this morning could be related to the melatonin sleep agent or the oxycodone 10 mg that was taken prior to the emesis. Her nausea seems to be resolved at this time. Abdomen soft nontender. No vertebral quadrant pain.

## 2017-09-12 NOTE — PROGRESS NOTES
1941 -- Report from FIDEL Salas. Pt arrived to room 303-1. Ambulated to bed with steady gait.    2044 -- AOx4. Afebrile. C/o heartburn 4/10, john given per pt request and seems to be helping. Admit profile and med rec completed. Assessment per doc flowsheet. PIV intact & patent. IVF infusing. POC discussed w/ pt, questions answered. Oriented to bed and room controls. Up to bathroom to void, then back to bed. Refused quiet pack. No additional concerns at this time. CLIP. Fall precautions in place. Bed locked & in low position.     2051 -- Repeat EKG done.    2352 -- Per pt, heartburn has subsided. Denied any needs. Continue to monitor.    0347 -- No status changes.     0610 -- Pt states she was able to get some sleep last night. Has no c/o CP this morning. Denies any needs at this time.    0728 -- Bedside report given to FIDEL Johnson. POC discussed w/ pt. Lines patent. Fall precautions in place.       Tele strip at 1948 shows sinus rhythm w/ HR of 68.     Measurements: 0.16 / 0.08 / 0.42    Tele Shift Summary:    Rhythm : SR/SB  Rate : 50-60s    Ectopy : Per CCT Gisselle, pt had rare PVC/PAC.     Telemetry monitoring strips placed in pt chart.

## 2017-09-12 NOTE — H&P
Hospital Medicine History and Physical    Date of Service  2017    Chief Complaint  Chief Complaint   Patient presents with   • N/V     Pt c/o n/v that began this am. Pt states she took an over the counter sleep aid to help with insomnia last night for the first time and woke up feeling very nausous this morning.    • Shoulder Pain     Pt c/o bilateral pain that begins in neck and radiates down arm. Pt states this began this am. Pt denies trauma.        History of Presenting Illness  71 y.o. female who presented 2017 with History of hypertension gastroesophageal reflux disease insomnia. She has a history of familial hemochromatosis. Currently taking estrogen and progesterone. She states that (9 PM she was having insomnia and took a melatonin over-the-counter sleep aid. The 12 PM she woke with stomach upset. She states usually when he could not sleep sister diluting her camper with food at 6:30 AM. They're planning a trip to the [facility]. She states she eats chicken soup at 6:30 in the morning particularly in our liter she took an oxycodone 10 mg within 2 hours she started sweating spell to 3 instructed vomiting straight for 2 hours. Start taking water and continued to vomit up the water for another 2 hours. She never had any diarrhea. She also claims that in the last 2 hours she had severe upper neck shoulder pain deviated down her shoulders. She never described any anterior chest discomfort or pain. She was concerned since her father  of an thoracic aortic aneurysm at 73 years of age.     Primary Care Physician  Akil SHEPPARD M.D.    Consultants  none    Code Status  Full code    Review of Systems  Review of Systems   Constitutional: Negative for chills, diaphoresis, fever and malaise/fatigue.   HENT: Negative for congestion and sore throat.    Eyes: Negative for pain and discharge.   Respiratory: Negative for cough, hemoptysis, sputum production, shortness of breath and wheezing.     Cardiovascular: Negative for chest pain, palpitations, claudication and leg swelling.   Gastrointestinal: Positive for nausea and vomiting. Negative for abdominal pain, constipation, diarrhea and melena.   Genitourinary: Negative for dysuria, frequency and urgency.   Musculoskeletal: Positive for back pain. Negative for joint pain, myalgias and neck pain.   Skin: Negative for itching and rash.   Neurological: Negative for dizziness, sensory change, speech change, focal weakness, loss of consciousness, weakness and headaches.   Endo/Heme/Allergies: Does not bruise/bleed easily.   Psychiatric/Behavioral: Negative for depression, substance abuse and suicidal ideas.        Past Medical History  Past Medical History:   Diagnosis Date   • Arthritis        Surgical History  Past Surgical History:   Procedure Laterality Date   • OTHER ORTHOPEDIC SURGERY  2014    R big toe joint replacement   • GYN SURGERY      tubal ligation   • OTHER ABDOMINAL SURGERY      Burst appendix in 1970s       Medications  No current facility-administered medications on file prior to encounter.      Current Outpatient Prescriptions on File Prior to Encounter   Medication Sig Dispense Refill   • amlodipine (NORVASC) 5 MG TABS Take 5 mg by mouth every day.     • losartan (COZAAR) 100 MG TABS Take 100 mg by mouth every day.         Family History  Family History   Problem Relation Age of Onset   • Other Father      aortic aneurysm rupture age 73.       Social History  Social History   Substance Use Topics   • Smoking status: Never Smoker   • Smokeless tobacco: Never Used   • Alcohol use Yes      Comment: rare   lives with .  Independent, active, no cane use.    Allergies  Allergies   Allergen Reactions   • Neosporin [Neomycin-Polymyxin B]      Per pt cant use for more than 5 days or gets red rash.   • Statins [Hmg-Coa-R Inhibitors]         Physical Exam  Laboratory   Hemodynamics  Temp (24hrs), Av.2 °C (97.2 °F), Min:36.2 °C (97.2 °F),  Max:36.2 °C (97.2 °F)   Temperature: 36.2 °C (97.2 °F)  Pulse  Av.5  Min: 68  Max: 77 Heart Rate (Monitored): 80  Blood Pressure : 158/82, NIBP: 156/81      Respiratory      Respiration: (!) 30, Pulse Oximetry: 97 %             Physical Exam   Constitutional: She is oriented to person, place, and time. She appears well-developed and well-nourished. No distress.   HENT:   Head: Normocephalic and atraumatic.   Nose: Nose normal.   Mouth/Throat: Oropharynx is clear and moist. No oropharyngeal exudate.   Eyes: Conjunctivae and EOM are normal. Pupils are equal, round, and reactive to light. Right eye exhibits no discharge. Left eye exhibits no discharge. No scleral icterus.   Neck: Normal range of motion. Neck supple. No JVD present. No tracheal deviation present. No thyromegaly present.   Cardiovascular: Normal rate, regular rhythm, normal heart sounds and intact distal pulses.  Exam reveals no gallop and no friction rub.    No murmur heard.  Pulmonary/Chest: Effort normal and breath sounds normal. No stridor. No respiratory distress. She has no wheezes. She has no rales. She exhibits no tenderness.   Abdominal: Soft. Bowel sounds are normal. She exhibits no distension and no mass. There is no tenderness. There is no rebound and no guarding.   Musculoskeletal: Normal range of motion. She exhibits no edema or tenderness.   Lymphadenopathy:     She has no cervical adenopathy.   Neurological: She is alert and oriented to person, place, and time. No cranial nerve deficit. She exhibits normal muscle tone. Coordination normal.   Skin: Skin is warm and dry. No rash noted. She is not diaphoretic. No erythema.   Psychiatric: She has a normal mood and affect. Her behavior is normal. Judgment and thought content normal.   Anxious   Nursing note and vitals reviewed.      Recent Labs      17   1551   WBC  9.4   RBC  4.25   HEMOGLOBIN  13.0   HEMATOCRIT  36.9*   MCV  86.8   MCH  30.6   MCHC  35.2*   RDW  43.5   PLATELETCT   170   MPV  11.1     Recent Labs      09/11/17   1551   SODIUM  138   POTASSIUM  4.1   CHLORIDE  106   CO2  20   GLUCOSE  127*   BUN  28*   CREATININE  1.46*   CALCIUM  9.6     Recent Labs      09/11/17   1551   ALTSGPT  12   ASTSGOT  18   ALKPHOSPHAT  62   TBILIRUBIN  0.8   LIPASE  48   GLUCOSE  127*                 Lab Results   Component Value Date    TROPONINI <0.02 09/11/2017     Urinalysis:    Lab Results  Component Value Date/Time   SPECGRAVITY 1.020 09/11/2017 1729   GLUCOSEUR Negative 09/11/2017 1729   KETONES Trace (A) 09/11/2017 1729   NITRITE Negative 09/11/2017 1729   WBCURINE 10-20 (A) 09/11/2017 1729   RBCURINE 2-5 (A) 09/11/2017 1729   BACTERIA Few (A) 09/11/2017 1729   EPITHELCELL Few 09/11/2017 1729        Imaging  CT chest:    Assessment/Plan    1.3 x 0.9 cm groundglass opacity in the left lower lobe may be infectious or inflammatory but an additional process such as bronchoalveolar carcinoma in situ is not excluded. Follow-up is recommended.    Atherosclerotic plaque.    3.5 cm right thyroid nodule with ultrasound is recommended.    Sclerotic focus within the T12 vertebral body may represent a bone island but metastatic disease is not excluded. Further evaluation can be obtained with nuclear medicine bone scan.    Parapelvic cysts.    1.2 cm right renal lesion may represent hemorrhagic/proteinaceous cyst but a solid mass is not excluded. Further evaluation can be obtained with renal protocol MRI.    Small hiatal hernia.      Ground Glass: CT at 6-12 months to confirm persistence, then CT every 2 years until 5 years    Part Solid: CT at 3-6 months to confirm persistence. If unchanged and solid component remains less than 6 mm, annual CT should be performed for 5 years.    Comments: In practice, part-solid nodules cannot be defined as such until equal to or greater than 6 mm, and nodules less than 6 mm do not usually require follow-up. Persistent part-solid nodules with solid components equal to or  greater than 6 mm should   be considered highly suspicious.    Note: These recommendations do not apply to lung cancer screening, patients with immunosuppression, or patients with known primary cancer.    Fleischner Society 2017 Guidelines for Management of Incidentally Detected Pulmonary Nodules in Adults   Reading Provider Reading Date   Fabien Murcia M.D. Sep 11, 2017        I anticipate this patient is appropriate for observation status at this time.    * Left-sided thoracic back pain- (present on admission)   Assessment & Plan    CTA of the aorta did not reveal any dissection however if there are several small lesions that will need follow-up as an outpatient. Including a left lower lobe opacity. Thyroid nodule.  Patient did appear to have chest pain equivalent with nausea vomiting as well as radiation into her bilateral shoulders and especially down her left arm. I have ordered a nuclear medicine stress test for the a.m. Her last stress test was in 2013 normal. Her last echocardiogram 2011 was normal.  EKG sinus rhythm rate of 65 no ST elevations or depressions. Patient is taking estrogen and progesterone. I have held these medications for now. May want to discontinue in the future since it is a risk for cardiac disease.        CKD (chronic kidney disease) stage 3, GFR 30-59 ml/min- (present on admission)   Assessment & Plan    Patient has known chronic kidney disease stage III follow-up by nephrology. Her current GFR is 35 BUN 28 cr 1.43.  Avoid nephrotoxins. She did not receive any IV contrast on CT of chest. Gentle hydrations normal saline 75 an hour for renal protection.        Insomnia- (present on admission)   Assessment & Plan    Persistent insomnia. Patient does appear to be anxious did suggest some Ativan to see if this would help with sleep. Have also suggested some relaxation techniques that can be looked up online.        Emesis- (present on admission)   Assessment & Plan    Sudden episode of  nausea vomiting persistent this morning could be related to the melatonin sleep agent or the oxycodone 10 mg that was taken prior to the emesis. Her nausea seems to be resolved at this time. Abdomen soft nontender. No vertebral quadrant pain.        HTN (hypertension)- (present on admission)   Assessment & Plan    Continue with home Norvasc 5 mg daily. As well as losartan 100 mg daily.            VTE prophylaxis: heparin.

## 2017-09-12 NOTE — CARE PLAN
Problem: Safety  Goal: Will remain free from injury  Outcome: PROGRESSING AS EXPECTED  Up ad [place] with steady gait. CLIP. Pt calls for assist appropriately. Hourly rounding. Personal belongings within reach. Non-skid socks. Bed locked & in low position.          Problem: Knowledge Deficit  Goal: Knowledge of disease process/condition, treatment plan, diagnostic tests, and medications will improve  Outcome: PROGRESSING AS EXPECTED  POC discussed w/ pt. Understands disease process and treatment plan. Educated on new meds (heparin) & procedures/tests. Pt will have chemical stress test in AM. Understands NPO after midnight. Questions answered.

## 2017-09-12 NOTE — ASSESSMENT & PLAN NOTE
Patient has known chronic kidney disease stage III follow-up by nephrology. Her current GFR is 35 BUN 28 cr 1.43.  Avoid nephrotoxins. She did not receive any IV contrast on CT of chest. Gentle hydrations normal saline 75 an hour for renal protection.

## 2017-09-12 NOTE — CARE PLAN
Problem: Communication  Goal: The ability to communicate needs accurately and effectively will improve  Outcome: PROGRESSING AS EXPECTED  POC discussed with pt and family. Allowed time for questions. Pt verbalizes understanding.    Problem: Safety  Goal: Will remain free from injury  Outcome: PROGRESSING AS EXPECTED  Safety precautions in place. Pt up self to bathroom, steady gait. Verbalizes understanding of safety instructions.

## 2017-09-12 NOTE — PROGRESS NOTES
Nursing care plan includes knowledge deficit, potential for discomfort, potential for compromised cardiac output. POC includes teaching, comfort measures and reassurance, and access to code cart, cardiology stand by and availability of rapid response team. Pt verbalizes good understanding of benefits and risks of pharmacological cardiac stress test. Informed consent obtained. Lexiscan given, pt developed the following symptoms flushing and palpitations and headache. VS stable, major symptoms resolved. To waiting room, caffeinated fluids and/or snacks given, awaiting second scan. Nursing goals met.

## 2017-09-12 NOTE — DISCHARGE SUMMARY
CHIEF COMPLAINT ON ADMISSION  Chief Complaint   Patient presents with   • N/V     Pt c/o n/v that began this am. Pt states she took an over the counter sleep aid to help with insomnia last night for the first time and woke up feeling very nausous this morning.    • Shoulder Pain     Pt c/o bilateral pain that begins in neck and radiates down arm. Pt states this began this am. Pt denies trauma.        CODE STATUS  Full Code    HPI & HOSPITAL COURSE  This is a 71 y.o. female here with GERD AND ATYPICAL CHEST PAIN.HER SYMPTOMS HAVE RESOLVED.TROPS ARE NEGATIVE AND IF HER CARDIAC STRESS TEST IS NEGATIVE THEN WILL DISCHARGE HER HOME.    GERD:  WILL INCREASE THE OMEPRAZOLE TO 40 MG PO DAILY    CT OF THE CHEST ABNORMAL FINDINGS:    Impression       1.3 x 0.9 cm groundglass opacity in the left lower lobe may be infectious or inflammatory but an additional process such as bronchoalveolar carcinoma in situ is not excluded. Follow-up is recommended.    Atherosclerotic plaque.    3.5 cm right thyroid nodule with ultrasound is recommended.    Sclerotic focus within the T12 vertebral body may represent a bone island but metastatic disease is not excluded. Further evaluation can be obtained with nuclear medicine bone scan.    Parapelvic cysts.    1.2 cm right renal lesion may represent hemorrhagic/proteinaceous cyst but a solid mass is not excluded. Further evaluation can be obtained with renal protocol MRI.    Small hiatal hernia.      Ground Glass: CT at 6-12 months to confirm persistence, then CT every 2 years until 5 years    Part Solid: CT at 3-6 months to confirm persistence. If unchanged and solid component remains less than 6 mm, annual CT should be performed for 5 years.    Comments: In practice, part-solid nodules cannot be defined as such until equal to or greater than 6 mm, and nodules less than 6 mm do not usually require follow-up. Persistent part-solid nodules with solid components equal to or greater than 6 mm  should   be considered highly suspicious.    Note: These recommendations do not apply to lung cancer screening, patients with immunosuppression, or patients with known primary cancer.    Fleischner Society 2017 Guidelines for Management of Incidentally Detected Pulmonary Nodules in Adults     I HAVE SPOKE TO THE PT ABOUT CT OF THE CHEST ABNORMALY FINDINGS.  SHE NEEDS TO F/U WITH PCP SO THE CT SCANS AND ULTRASOUND  CAN BE ORDERED.    Therefore, she is discharged in good and stable condition with close outpatient follow-up.    SPECIFIC OUTPATIENT FOLLOW-UP  PCP IN 1 WEEK    DISCHARGE PROBLEM LIST  Principal Problem:    Left-sided thoracic back pain POA: Yes  Active Problems:    HTN (hypertension) POA: Yes    Emesis POA: Yes    Insomnia POA: Yes    CKD (chronic kidney disease) stage 3, GFR 30-59 ml/min POA: Yes  Resolved Problems:    * No resolved hospital problems. *      FOLLOW UP  No future appointments.  Akil SHEPPARD M.D.  [id number] [facility] [address]  [phone]    Schedule an appointment as soon as possible for a visit in 2 weeks  Follow Up from hospital stay in 2 weeks, In addition follow up with PCP in 6 months for Ultrasound of the Thyroid and CT of the Chest      MEDICATIONS ON DISCHARGE   Berta Cohen   Home Medication Instructions HIRO:[id number]    Printed on:09/12/17 3796   Medication Information                      amlodipine (NORVASC) 5 MG TABS  Take 5 mg by mouth every day.             CALCIUM PO  Take 1 Tab by mouth every day.             estradiol (ESTRACE) 0.5 MG tablet  Take 0.5 mg by mouth every day.             Flax Oil  Take 1 Cap by mouth every day.             losartan (COZAAR) 100 MG TABS  Take 100 mg by mouth every day.             medroxyPROGESTERone (PROVERA) 2.5 MG Tab  Take 2.5 mg by mouth every day.             Misc Natural Products (TURMERIC CURCUMIN) Cap  Take 1 Cap by mouth every day.             omeprazole (PRILOSEC) 20 MG delayed-release capsule  Take 2  Caps by mouth every day.             oxycodone-acetaminophen (PERCOCET-10)  MG Tab  Take 1-2 Tabs by mouth every four hours as needed for Severe Pain.             vitamin D (CHOLECALCIFEROL) 1000 UNIT Tab  Take 1,000 Units by mouth every day.                 DIET  Orders Placed This Encounter   Procedures   • Protocol 1313 Diet Order: Reg, No Decaf, No Caffeine- Cardiac Stress Test: Pharmacological     Standing Status:   Standing     Number of Occurrences:   1     Order Specific Question:   Diet:     Answer:   Regular [1]     Order Specific Question:   Miscellaneous modifications:     Answer:   No Decaf, No Caffeine(for test) [11]     Comments:   Protocol 1313 Patient to have no caffeine for 12 hours prior to exam (decaf, coffee, cola, tea, chocolate)       ACTIVITY  As tolerated.  Weight bearing as tolerated      CONSULTATIONS  NONE    PROCEDURES  CARDIAC STRESS TEST    LABORATORY  Lab Results   Component Value Date/Time    SODIUM 138 09/12/2017 04:58 AM    POTASSIUM 4.2 09/12/2017 04:58 AM    CHLORIDE 111 09/12/2017 04:58 AM    CO2 21 09/12/2017 04:58 AM    GLUCOSE 92 09/12/2017 04:58 AM    BUN 24 (H) 09/12/2017 04:58 AM    CREATININE 1.43 (H) 09/12/2017 04:58 AM        Lab Results   Component Value Date/Time    WBC 7.1 09/12/2017 04:58 AM    HEMOGLOBIN 10.9 (L) 09/12/2017 04:58 AM    HEMATOCRIT 32.2 (L) 09/12/2017 04:58 AM    PLATELETCT 162 (L) 09/12/2017 04:58 AM        Total time of the discharge process exceeds 36 minutes

## 2017-09-12 NOTE — PROGRESS NOTES
Bedside report received from Alisa PARRA @ 0715. Assumed care. POC discussed. Pt resting comfortably in bed. Safety precautions in place.

## 2017-09-12 NOTE — DISCHARGE PLANNING
Medical Social Work    Referral: Pt discussed at IDT rounds this AM.    Intervention: Per flowsheet, pt lives with spouse and expects to d.c home.  Possible d.c home today.  No SS needs identified nor any requests for MIRNA Cherry during rounds.      Plan: MIRNA available for any assistance with d.c planning.

## 2017-09-12 NOTE — PROGRESS NOTES
Pt back from stress test. Due meds given. Meal tray provided. Report to Toro PARRA. Pt sitting EOB eating breakfast, family at bedside, IVF infusing.

## 2017-09-12 NOTE — PROGRESS NOTES
Assessment completed. Pt AAOx4, c/o heartburn but no overt chest pain, unchanged from admission. No n/v. Pt taken to [facility] [place] [place] med via wheelchair for stress test.

## 2017-09-13 NOTE — PROGRESS NOTES
Pt d/c'd with all belongings. IV and armband removed. D/c instructions and Rx explained to pt. Taken to vehicle by CNA via WC.     .Tele strip at 0700 shows SR w/ HR of 61  Measurements: .14/.10/.44    Tele Shift Summary:  Rhythm: SR  Rate: 60's-70's  Ectopy: Per CCT Deana, pt had no ectopy.    Telemetry monitoring strips placed in pt chart.

## 2017-09-14 ENCOUNTER — PATIENT OUTREACH (OUTPATIENT)
Dept: HEALTH INFORMATION MANAGEMENT | Facility: OTHER | Age: 72
End: 2017-09-14

## 2017-09-14 LAB
BACTERIA UR CULT: NORMAL
SIGNIFICANT IND 70042: NORMAL
SITE SITE: NORMAL
SOURCE SOURCE: NORMAL

## 2017-09-14 NOTE — PROGRESS NOTES
9/14/17 at 1:30 PM--Placed discharge outreach phone call to patient s/p hospital discharge 9/12/17.  Left voicemail with my contact information and instructions to return my phone call.    9/14/17 at 3:29 PM--Second attempt at discharge outreach phone call to patient s/p hospital discharge 9/12/17.  Left voicemail with my contact information and instructions to return my phone call.

## 2017-09-18 PROBLEM — E78.5 DYSLIPIDEMIA: Status: ACTIVE | Noted: 2017-09-18

## 2017-09-18 PROBLEM — E04.1 THYROID NODULE: Status: ACTIVE | Noted: 2017-09-18

## 2017-10-04 ENCOUNTER — HOSPITAL ENCOUNTER (OUTPATIENT)
Dept: RADIOLOGY | Facility: MEDICAL CENTER | Age: 72
End: 2017-10-04
Attending: FAMILY MEDICINE
Payer: MEDICARE

## 2017-10-04 DIAGNOSIS — N18.2 CHRONIC KIDNEY DISEASE, STAGE II (MILD): ICD-10-CM

## 2017-10-04 DIAGNOSIS — E04.1 NONTOXIC UNINODULAR GOITER: ICD-10-CM

## 2017-10-04 DIAGNOSIS — E04.1 THYROID NODULE: ICD-10-CM

## 2017-10-04 PROCEDURE — 76536 US EXAM OF HEAD AND NECK: CPT

## 2017-10-04 PROCEDURE — 72146 MRI CHEST SPINE W/O DYE: CPT

## 2017-10-04 PROCEDURE — 700117 HCHG RX CONTRAST REV CODE 255: Performed by: FAMILY MEDICINE

## 2017-10-04 PROCEDURE — A9577 INJ MULTIHANCE: HCPCS | Performed by: FAMILY MEDICINE

## 2017-10-04 PROCEDURE — 74183 MRI ABD W/O CNTR FLWD CNTR: CPT

## 2017-10-04 RX ADMIN — GADOBENATE DIMEGLUMINE 8 ML: 529 INJECTION, SOLUTION INTRAVENOUS at 15:21

## 2018-07-12 ENCOUNTER — HOSPITAL ENCOUNTER (OUTPATIENT)
Dept: RADIOLOGY | Facility: MEDICAL CENTER | Age: 73
End: 2018-07-12
Attending: FAMILY MEDICINE
Payer: MEDICARE

## 2018-07-12 DIAGNOSIS — R68.84 JAW PAIN: ICD-10-CM

## 2018-07-12 PROCEDURE — 70330 X-RAY EXAM OF JAW JOINTS: CPT

## 2019-05-15 ENCOUNTER — HOSPITAL ENCOUNTER (OUTPATIENT)
Dept: RADIOLOGY | Facility: MEDICAL CENTER | Age: 74
End: 2019-05-15
Attending: FAMILY MEDICINE
Payer: MEDICARE

## 2019-05-15 DIAGNOSIS — M19.041 PRIMARY LOCALIZED OSTEOARTHROSIS OF RIGHT HAND: ICD-10-CM

## 2019-05-15 DIAGNOSIS — M19.042 PRIMARY LOCALIZED OSTEOARTHROSIS OF LEFT HAND: ICD-10-CM

## 2019-05-15 PROCEDURE — 73120 X-RAY EXAM OF HAND: CPT | Mod: LT

## 2019-05-15 PROCEDURE — 73120 X-RAY EXAM OF HAND: CPT | Mod: RT

## 2019-10-10 ENCOUNTER — HOSPITAL ENCOUNTER (OUTPATIENT)
Dept: RADIOLOGY | Facility: MEDICAL CENTER | Age: 74
End: 2019-10-10
Attending: FAMILY MEDICINE
Payer: MEDICARE

## 2019-10-10 DIAGNOSIS — M25.511 RIGHT SHOULDER PAIN, UNSPECIFIED CHRONICITY: ICD-10-CM

## 2019-10-10 PROCEDURE — 73030 X-RAY EXAM OF SHOULDER: CPT | Mod: RT

## 2020-04-11 ENCOUNTER — HOSPITAL ENCOUNTER (OUTPATIENT)
Dept: RADIOLOGY | Facility: MEDICAL CENTER | Age: 75
End: 2020-04-11
Attending: INTERNAL MEDICINE
Payer: MEDICARE

## 2020-04-11 DIAGNOSIS — N18.30 CHRONIC KIDNEY DISEASE, STAGE III (MODERATE): ICD-10-CM

## 2020-04-11 PROCEDURE — 76775 US EXAM ABDO BACK WALL LIM: CPT

## 2020-10-21 ENCOUNTER — HOSPITAL ENCOUNTER (OUTPATIENT)
Dept: LAB | Facility: MEDICAL CENTER | Age: 75
End: 2020-10-21
Attending: INTERNAL MEDICINE
Payer: MEDICARE

## 2020-10-21 LAB
ALBUMIN SERPL BCP-MCNC: 4.2 G/DL (ref 3.2–4.9)
ALBUMIN/GLOB SERPL: 2 G/DL
ALP SERPL-CCNC: 44 U/L (ref 30–99)
ALT SERPL-CCNC: 10 U/L (ref 2–50)
ANION GAP SERPL CALC-SCNC: 7 MMOL/L (ref 7–16)
AST SERPL-CCNC: 12 U/L (ref 12–45)
BASOPHILS # BLD AUTO: 0.6 % (ref 0–1.8)
BASOPHILS # BLD: 0.03 K/UL (ref 0–0.12)
BILIRUB SERPL-MCNC: 0.4 MG/DL (ref 0.1–1.5)
BUN SERPL-MCNC: 31 MG/DL (ref 8–22)
CALCIUM SERPL-MCNC: 9.4 MG/DL (ref 8.5–10.5)
CHLORIDE SERPL-SCNC: 104 MMOL/L (ref 96–112)
CO2 SERPL-SCNC: 22 MMOL/L (ref 20–33)
CREAT SERPL-MCNC: 1.58 MG/DL (ref 0.5–1.4)
EOSINOPHIL # BLD AUTO: 0.18 K/UL (ref 0–0.51)
EOSINOPHIL NFR BLD: 3.7 % (ref 0–6.9)
ERYTHROCYTE [DISTWIDTH] IN BLOOD BY AUTOMATED COUNT: 51.2 FL (ref 35.9–50)
FASTING STATUS PATIENT QL REPORTED: NORMAL
FERRITIN SERPL-MCNC: 19.9 NG/ML (ref 10–291)
GLOBULIN SER CALC-MCNC: 2.1 G/DL (ref 1.9–3.5)
GLUCOSE SERPL-MCNC: 95 MG/DL (ref 65–99)
HCT VFR BLD AUTO: 35 % (ref 37–47)
HGB BLD-MCNC: 11 G/DL (ref 12–16)
IMM GRANULOCYTES # BLD AUTO: 0.02 K/UL (ref 0–0.11)
IMM GRANULOCYTES NFR BLD AUTO: 0.4 % (ref 0–0.9)
IRON SATN MFR SERPL: 15 % (ref 15–55)
IRON SERPL-MCNC: 41 UG/DL (ref 40–170)
LYMPHOCYTES # BLD AUTO: 1.21 K/UL (ref 1–4.8)
LYMPHOCYTES NFR BLD: 25.1 % (ref 22–41)
MCH RBC QN AUTO: 30.4 PG (ref 27–33)
MCHC RBC AUTO-ENTMCNC: 31.4 G/DL (ref 33.6–35)
MCV RBC AUTO: 96.7 FL (ref 81.4–97.8)
MONOCYTES # BLD AUTO: 0.43 K/UL (ref 0–0.85)
MONOCYTES NFR BLD AUTO: 8.9 % (ref 0–13.4)
NEUTROPHILS # BLD AUTO: 2.96 K/UL (ref 2–7.15)
NEUTROPHILS NFR BLD: 61.3 % (ref 44–72)
NRBC # BLD AUTO: 0 K/UL
NRBC BLD-RTO: 0 /100 WBC
PLATELET # BLD AUTO: 209 K/UL (ref 164–446)
PMV BLD AUTO: 11.9 FL (ref 9–12.9)
POTASSIUM SERPL-SCNC: 4.4 MMOL/L (ref 3.6–5.5)
PROT SERPL-MCNC: 6.3 G/DL (ref 6–8.2)
RBC # BLD AUTO: 3.62 M/UL (ref 4.2–5.4)
SODIUM SERPL-SCNC: 133 MMOL/L (ref 135–145)
TIBC SERPL-MCNC: 275 UG/DL (ref 250–450)
UIBC SERPL-MCNC: 234 UG/DL (ref 110–370)
WBC # BLD AUTO: 4.8 K/UL (ref 4.8–10.8)

## 2020-10-21 PROCEDURE — 36415 COLL VENOUS BLD VENIPUNCTURE: CPT

## 2020-10-21 PROCEDURE — 80053 COMPREHEN METABOLIC PANEL: CPT

## 2020-10-21 PROCEDURE — 82728 ASSAY OF FERRITIN: CPT

## 2020-10-21 PROCEDURE — 83550 IRON BINDING TEST: CPT

## 2020-10-21 PROCEDURE — 83540 ASSAY OF IRON: CPT

## 2020-10-21 PROCEDURE — 85025 COMPLETE CBC W/AUTO DIFF WBC: CPT

## 2021-01-14 DIAGNOSIS — Z23 NEED FOR VACCINATION: ICD-10-CM

## 2021-02-10 ENCOUNTER — IMMUNIZATION (OUTPATIENT)
Dept: FAMILY PLANNING/WOMEN'S HEALTH CLINIC | Facility: IMMUNIZATION CENTER | Age: 76
End: 2021-02-10
Attending: INTERNAL MEDICINE
Payer: MEDICARE

## 2021-02-10 DIAGNOSIS — Z23 ENCOUNTER FOR VACCINATION: Primary | ICD-10-CM

## 2021-02-10 DIAGNOSIS — Z23 NEED FOR VACCINATION: ICD-10-CM

## 2021-02-10 PROCEDURE — 91300 PFIZER SARS-COV-2 VACCINE: CPT

## 2021-02-10 PROCEDURE — 0001A PFIZER SARS-COV-2 VACCINE: CPT

## 2021-03-03 ENCOUNTER — IMMUNIZATION (OUTPATIENT)
Dept: FAMILY PLANNING/WOMEN'S HEALTH CLINIC | Facility: IMMUNIZATION CENTER | Age: 76
End: 2021-03-03
Attending: INTERNAL MEDICINE
Payer: MEDICARE

## 2021-03-03 DIAGNOSIS — Z23 ENCOUNTER FOR VACCINATION: Primary | ICD-10-CM

## 2021-03-03 PROCEDURE — 91300 PFIZER SARS-COV-2 VACCINE: CPT

## 2021-03-03 PROCEDURE — 0002A PFIZER SARS-COV-2 VACCINE: CPT

## 2021-03-24 ENCOUNTER — HOSPITAL ENCOUNTER (OUTPATIENT)
Dept: LAB | Facility: MEDICAL CENTER | Age: 76
End: 2021-03-24
Attending: INTERNAL MEDICINE
Payer: MEDICARE

## 2021-03-24 LAB
ALBUMIN SERPL BCP-MCNC: 4.2 G/DL (ref 3.2–4.9)
BUN SERPL-MCNC: 26 MG/DL (ref 8–22)
CALCIUM SERPL-MCNC: 10.2 MG/DL (ref 8.5–10.5)
CHLORIDE SERPL-SCNC: 107 MMOL/L (ref 96–112)
CO2 SERPL-SCNC: 21 MMOL/L (ref 20–33)
CREAT SERPL-MCNC: 1.53 MG/DL (ref 0.5–1.4)
CREAT UR-MCNC: 106.74 MG/DL
GLUCOSE SERPL-MCNC: 89 MG/DL (ref 65–99)
PHOSPHATE SERPL-MCNC: 3.6 MG/DL (ref 2.5–4.5)
POTASSIUM SERPL-SCNC: 4.7 MMOL/L (ref 3.6–5.5)
PROT UR-MCNC: 84 MG/DL (ref 0–15)
PROT/CREAT UR: 787 MG/G (ref 10–107)
SODIUM SERPL-SCNC: 138 MMOL/L (ref 135–145)

## 2021-03-24 PROCEDURE — 82570 ASSAY OF URINE CREATININE: CPT

## 2021-03-24 PROCEDURE — 84156 ASSAY OF PROTEIN URINE: CPT

## 2021-03-24 PROCEDURE — 80069 RENAL FUNCTION PANEL: CPT

## 2021-03-24 PROCEDURE — 36415 COLL VENOUS BLD VENIPUNCTURE: CPT

## 2021-04-30 ENCOUNTER — HOSPITAL ENCOUNTER (EMERGENCY)
Facility: MEDICAL CENTER | Age: 76
End: 2021-04-30
Attending: EMERGENCY MEDICINE
Payer: MEDICARE

## 2021-04-30 ENCOUNTER — APPOINTMENT (OUTPATIENT)
Dept: RADIOLOGY | Facility: MEDICAL CENTER | Age: 76
End: 2021-04-30
Attending: EMERGENCY MEDICINE
Payer: MEDICARE

## 2021-04-30 VITALS
TEMPERATURE: 98 F | HEIGHT: 64 IN | WEIGHT: 174.16 LBS | BODY MASS INDEX: 29.73 KG/M2 | SYSTOLIC BLOOD PRESSURE: 156 MMHG | OXYGEN SATURATION: 99 % | HEART RATE: 91 BPM | RESPIRATION RATE: 16 BRPM | DIASTOLIC BLOOD PRESSURE: 72 MMHG

## 2021-04-30 DIAGNOSIS — M17.11 TRICOMPARTMENT OSTEOARTHRITIS OF RIGHT KNEE: ICD-10-CM

## 2021-04-30 DIAGNOSIS — M25.561 ACUTE PAIN OF RIGHT KNEE: ICD-10-CM

## 2021-04-30 PROCEDURE — 99284 EMERGENCY DEPT VISIT MOD MDM: CPT

## 2021-04-30 PROCEDURE — 73564 X-RAY EXAM KNEE 4 OR MORE: CPT | Mod: RT

## 2021-04-30 ASSESSMENT — ENCOUNTER SYMPTOMS
FEVER: 0
FALLS: 0

## 2021-04-30 ASSESSMENT — FIBROSIS 4 INDEX: FIB4 SCORE: 1.4

## 2021-04-30 NOTE — ED TRIAGE NOTES
"Presents complaining of right knee pain.  Pt describes a  twisting motion causing injury to her joint, while standing, yesterday.  She denies a fall.   Chief Complaint   Patient presents with   • Knee Injury     BP (!) 186/91   Pulse 82   Temp 36.7 °C (98 °F) (Temporal)   Resp 18   Ht 1.626 m (5' 4\")   Wt 79 kg (174 lb 2.6 oz)   SpO2 96%   BMI 29.90 kg/m²      "

## 2021-04-30 NOTE — ED PROVIDER NOTES
ED Provider Note    ED Provider Note    Primary care provider: Akil SHEPPARD M.D.  Means of arrival: POV  History obtained from: patient  History limited by: None    CHIEF COMPLAINT  Chief Complaint   Patient presents with   • Knee Injury       HPI  Berta Cohen is a 75 y.o. female who presents to the Emergency Department chief complaint of right knee pain.  Patient states that yesterday she was gardening and stepped down from a wall in the garden that about 14 inches high.  As she stepped down, she states her knee buckled but she did not fall.  It twisted slightly.  She denies hearing any pop.  She had a brief episode of pain.  She was able to go inside and finish vacuuming and doing some house chores.  Then later, she was sitting with friends and after several hours, got up and noted achy pain.  This continued throughout the night she noted it when waking up to go to the bathroom.  This morning, she woke up with increased pain prompting her ED visit.  She states the pain is primarily with weight bearing.  She can sit and actively range her own knee without significant symptoms.  She denies significant swelling.  She does not feel that it is unstable.  She denies any other injuries again, she did not fall.  She did ice which provided some relief and this morning she was alternating ice and heat.    REVIEW OF SYSTEMS  Review of Systems   Constitutional: Negative for fever.   Musculoskeletal: Positive for joint pain. Negative for falls.   Skin: Positive for rash.   All other systems reviewed and are negative.      PAST MEDICAL HISTORY   has a past medical history of Arthritis.    SURGICAL HISTORY   has a past surgical history that includes other abdominal surgery; gyn surgery; other orthopedic surgery (2014); and tonsillectomy.    SOCIAL HISTORY  Social History     Tobacco Use   • Smoking status: Never Smoker   • Smokeless tobacco: Never Used   Substance Use Topics   • Alcohol use: Yes     Comment:  "Rarely   • Drug use: No      Social History     Substance and Sexual Activity   Drug Use No       FAMILY HISTORY  Family History   Problem Relation Age of Onset   • Other Father         aortic aneurysm rupture age 73.       CURRENT MEDICATIONS  Home Medications    **Home medications have not yet been reviewed for this encounter**         ALLERGIES  Allergies   Allergen Reactions   • Neosporin [Neomycin-Polymyxin B]      Per pt cant use for more than 5 days or gets red rash.   • Statins [Hmg-Coa-R Inhibitors]        PHYSICAL EXAM  VITAL SIGNS: /72   Pulse 91   Temp 36.7 °C (98 °F) (Temporal)   Resp 16   Ht 1.626 m (5' 4\")   Wt 79 kg (174 lb 2.6 oz)   SpO2 99%   BMI 29.90 kg/m²   Vitals reviewed.  Constitutional: Patient is oriented to person, place, and time. Appears well-developed and well-nourished. Mild distress.    Head: Normocephalic and atraumatic.   Eyes: Conjunctivae are normal.   Neck: Normal range of motion.   Cardiovascular: Normal rate, regular rhythm and normal heart sounds. Normal peripheral pulses, bilateral, lower extremity.  Pulmonary/Chest: Effort normal and breath sounds normal. No respiratory distress.  Musculoskeletal: No edema and no tenderness. No laxity.  There is some pain with heel stress of the right knee.  Negative anterior drawer test.  Negative calf tenderness.  No redness.  There is very minor swelling to the proximal aspect of the right knee.  No increased warmth when compared to the contralateral side.  Neurological: No focal deficits.   Skin: Skin is warm and dry. No erythema. No pallor.   Psychiatric: Patient has a normal mood and affect.     RADIOLOGY  DX-KNEE COMPLETE 4+ RIGHT   Final Result      Tricompartment degenerative change. No evidence of fracture.        The radiologist's interpretation of all radiological studies have been reviewed by me.    COURSE & MEDICAL DECISION MAKING  Pertinent Labs & Imaging studies reviewed. (See chart for details)    Obtained and " reviewed past medical records.  Patient's last encounter was a hospital admission in September 2017 she was admitted for atypical chest pain.  Cardiac stress test was negative she was treated with PPI and discharged home.    10:07 AM - Patient seen and examined at bedside.  This is a pleasant 75-year-old female who presents with right knee pain.  Overall, exam is on revealing.  She has increased pain with weightbearing this is after stepping off about a 14 inch wall yesterday.  She describes achy pain.  There is very mild proximal swelling.  No laxity.  She is otherwise well-appearing.  Of ordered an x-ray to further evaluate her symptoms.  We discussed the possibility of tibial plateau fracture although this seems less likely I suspect that this is likely ligamentous strain.    1115AM patient is reevaluated at the bedside.  We discussed x-ray findings consistent with tricompartment degenerative arthritis of the knee.  No fracture.  Patient declined crutches Ace wrap or knee immobilizer.  She is advised on taking Tylenol or ibuprofen.  She declined pain medications.  Exercise or activity as tolerated avoiding activities that cause pain.  She is well-appearing and nontoxic with normal vital signs.  She will be discharged home in stable condition.      FINAL IMPRESSION  1. Tricompartment osteoarthritis of right knee    2. Acute pain of right knee

## 2021-05-14 ENCOUNTER — HOSPITAL ENCOUNTER (OUTPATIENT)
Dept: LAB | Facility: MEDICAL CENTER | Age: 76
End: 2021-05-14
Attending: INTERNAL MEDICINE
Payer: MEDICARE

## 2021-05-14 LAB
BASOPHILS # BLD AUTO: 0.6 % (ref 0–1.8)
BASOPHILS # BLD: 0.03 K/UL (ref 0–0.12)
EOSINOPHIL # BLD AUTO: 0.22 K/UL (ref 0–0.51)
EOSINOPHIL NFR BLD: 4.2 % (ref 0–6.9)
ERYTHROCYTE [DISTWIDTH] IN BLOOD BY AUTOMATED COUNT: 49.6 FL (ref 35.9–50)
HCT VFR BLD AUTO: 39.7 % (ref 37–47)
HGB BLD-MCNC: 12.8 G/DL (ref 12–16)
IMM GRANULOCYTES # BLD AUTO: 0.02 K/UL (ref 0–0.11)
IMM GRANULOCYTES NFR BLD AUTO: 0.4 % (ref 0–0.9)
LYMPHOCYTES # BLD AUTO: 1.29 K/UL (ref 1–4.8)
LYMPHOCYTES NFR BLD: 24.6 % (ref 22–41)
MCH RBC QN AUTO: 30.6 PG (ref 27–33)
MCHC RBC AUTO-ENTMCNC: 32.2 G/DL (ref 33.6–35)
MCV RBC AUTO: 95 FL (ref 81.4–97.8)
MONOCYTES # BLD AUTO: 0.43 K/UL (ref 0–0.85)
MONOCYTES NFR BLD AUTO: 8.2 % (ref 0–13.4)
NEUTROPHILS # BLD AUTO: 3.25 K/UL (ref 2–7.15)
NEUTROPHILS NFR BLD: 62 % (ref 44–72)
NRBC # BLD AUTO: 0 K/UL
NRBC BLD-RTO: 0 /100 WBC
PLATELET # BLD AUTO: 216 K/UL (ref 164–446)
PMV BLD AUTO: 11.7 FL (ref 9–12.9)
RBC # BLD AUTO: 4.18 M/UL (ref 4.2–5.4)
WBC # BLD AUTO: 5.2 K/UL (ref 4.8–10.8)

## 2021-05-14 PROCEDURE — 83550 IRON BINDING TEST: CPT

## 2021-05-14 PROCEDURE — 36415 COLL VENOUS BLD VENIPUNCTURE: CPT

## 2021-05-14 PROCEDURE — 83540 ASSAY OF IRON: CPT

## 2021-05-14 PROCEDURE — 80053 COMPREHEN METABOLIC PANEL: CPT

## 2021-05-14 PROCEDURE — 85025 COMPLETE CBC W/AUTO DIFF WBC: CPT

## 2021-05-14 PROCEDURE — 82728 ASSAY OF FERRITIN: CPT

## 2021-05-15 LAB
ALBUMIN SERPL BCP-MCNC: 4.4 G/DL (ref 3.2–4.9)
ALBUMIN/GLOB SERPL: 1.9 G/DL
ALP SERPL-CCNC: 50 U/L (ref 30–99)
ALT SERPL-CCNC: 7 U/L (ref 2–50)
ANION GAP SERPL CALC-SCNC: 9 MMOL/L (ref 7–16)
AST SERPL-CCNC: 14 U/L (ref 12–45)
BILIRUB SERPL-MCNC: 0.5 MG/DL (ref 0.1–1.5)
BUN SERPL-MCNC: 39 MG/DL (ref 8–22)
CALCIUM SERPL-MCNC: 10.1 MG/DL (ref 8.5–10.5)
CHLORIDE SERPL-SCNC: 108 MMOL/L (ref 96–112)
CO2 SERPL-SCNC: 20 MMOL/L (ref 20–33)
CREAT SERPL-MCNC: 1.58 MG/DL (ref 0.5–1.4)
FERRITIN SERPL-MCNC: 33.1 NG/ML (ref 10–291)
GLOBULIN SER CALC-MCNC: 2.3 G/DL (ref 1.9–3.5)
GLUCOSE SERPL-MCNC: 85 MG/DL (ref 65–99)
IRON SATN MFR SERPL: 64 % (ref 15–55)
IRON SERPL-MCNC: 181 UG/DL (ref 40–170)
POTASSIUM SERPL-SCNC: 5.2 MMOL/L (ref 3.6–5.5)
PROT SERPL-MCNC: 6.7 G/DL (ref 6–8.2)
SODIUM SERPL-SCNC: 137 MMOL/L (ref 135–145)
TIBC SERPL-MCNC: 281 UG/DL (ref 250–450)
UIBC SERPL-MCNC: 100 UG/DL (ref 110–370)

## 2021-08-17 ENCOUNTER — HOSPITAL ENCOUNTER (OUTPATIENT)
Dept: LAB | Facility: MEDICAL CENTER | Age: 76
End: 2021-08-17
Attending: INTERNAL MEDICINE
Payer: MEDICARE

## 2021-08-17 LAB
ALBUMIN SERPL BCP-MCNC: 4.2 G/DL (ref 3.2–4.9)
ALBUMIN SERPL BCP-MCNC: 4.2 G/DL (ref 3.2–4.9)
ALBUMIN/GLOB SERPL: 1.8 G/DL
ALP SERPL-CCNC: 48 U/L (ref 30–99)
ALT SERPL-CCNC: 10 U/L (ref 2–50)
ANION GAP SERPL CALC-SCNC: 11 MMOL/L (ref 7–16)
AST SERPL-CCNC: 14 U/L (ref 12–45)
BASOPHILS # BLD AUTO: 0.6 % (ref 0–1.8)
BASOPHILS # BLD: 0.03 K/UL (ref 0–0.12)
BILIRUB SERPL-MCNC: 0.4 MG/DL (ref 0.1–1.5)
BUN SERPL-MCNC: 41 MG/DL (ref 8–22)
BUN SERPL-MCNC: 41 MG/DL (ref 8–22)
CALCIUM SERPL-MCNC: 8.9 MG/DL (ref 8.5–10.5)
CALCIUM SERPL-MCNC: 9.5 MG/DL (ref 8.5–10.5)
CHLORIDE SERPL-SCNC: 106 MMOL/L (ref 96–112)
CHLORIDE SERPL-SCNC: 106 MMOL/L (ref 96–112)
CO2 SERPL-SCNC: 19 MMOL/L (ref 20–33)
CO2 SERPL-SCNC: 19 MMOL/L (ref 20–33)
CREAT SERPL-MCNC: 1.42 MG/DL (ref 0.5–1.4)
CREAT SERPL-MCNC: 1.43 MG/DL (ref 0.5–1.4)
CREAT UR-MCNC: 96.21 MG/DL
EOSINOPHIL # BLD AUTO: 0.24 K/UL (ref 0–0.51)
EOSINOPHIL NFR BLD: 4.5 % (ref 0–6.9)
ERYTHROCYTE [DISTWIDTH] IN BLOOD BY AUTOMATED COUNT: 45.3 FL (ref 35.9–50)
FASTING STATUS PATIENT QL REPORTED: NORMAL
FERRITIN SERPL-MCNC: 18.6 NG/ML (ref 10–291)
GLOBULIN SER CALC-MCNC: 2.3 G/DL (ref 1.9–3.5)
GLUCOSE SERPL-MCNC: 91 MG/DL (ref 65–99)
GLUCOSE SERPL-MCNC: 92 MG/DL (ref 65–99)
HCT VFR BLD AUTO: 36.2 % (ref 37–47)
HGB BLD-MCNC: 11.8 G/DL (ref 12–16)
IMM GRANULOCYTES # BLD AUTO: 0.03 K/UL (ref 0–0.11)
IMM GRANULOCYTES NFR BLD AUTO: 0.6 % (ref 0–0.9)
IRON SERPL-MCNC: 63 UG/DL (ref 40–170)
LYMPHOCYTES # BLD AUTO: 1.25 K/UL (ref 1–4.8)
LYMPHOCYTES NFR BLD: 23.3 % (ref 22–41)
MCH RBC QN AUTO: 29.9 PG (ref 27–33)
MCHC RBC AUTO-ENTMCNC: 32.6 G/DL (ref 33.6–35)
MCV RBC AUTO: 91.9 FL (ref 81.4–97.8)
MONOCYTES # BLD AUTO: 0.5 K/UL (ref 0–0.85)
MONOCYTES NFR BLD AUTO: 9.3 % (ref 0–13.4)
NEUTROPHILS # BLD AUTO: 3.32 K/UL (ref 2–7.15)
NEUTROPHILS NFR BLD: 61.7 % (ref 44–72)
NRBC # BLD AUTO: 0 K/UL
NRBC BLD-RTO: 0 /100 WBC
PHOSPHATE SERPL-MCNC: 3.2 MG/DL (ref 2.5–4.5)
PLATELET # BLD AUTO: 204 K/UL (ref 164–446)
PMV BLD AUTO: 11.8 FL (ref 9–12.9)
POTASSIUM SERPL-SCNC: 4.6 MMOL/L (ref 3.6–5.5)
POTASSIUM SERPL-SCNC: 4.6 MMOL/L (ref 3.6–5.5)
PROT SERPL-MCNC: 6.5 G/DL (ref 6–8.2)
PROT UR-MCNC: 33 MG/DL (ref 0–15)
PROT/CREAT UR: 343 MG/G (ref 10–107)
RBC # BLD AUTO: 3.94 M/UL (ref 4.2–5.4)
SODIUM SERPL-SCNC: 136 MMOL/L (ref 135–145)
SODIUM SERPL-SCNC: 136 MMOL/L (ref 135–145)
WBC # BLD AUTO: 5.4 K/UL (ref 4.8–10.8)

## 2021-08-17 PROCEDURE — 84156 ASSAY OF PROTEIN URINE: CPT

## 2021-08-17 PROCEDURE — 85025 COMPLETE CBC W/AUTO DIFF WBC: CPT

## 2021-08-17 PROCEDURE — 82570 ASSAY OF URINE CREATININE: CPT

## 2021-08-17 PROCEDURE — 36415 COLL VENOUS BLD VENIPUNCTURE: CPT

## 2021-08-17 PROCEDURE — 80069 RENAL FUNCTION PANEL: CPT

## 2021-08-17 PROCEDURE — 82728 ASSAY OF FERRITIN: CPT

## 2021-08-17 PROCEDURE — 80053 COMPREHEN METABOLIC PANEL: CPT

## 2021-08-17 PROCEDURE — 83540 ASSAY OF IRON: CPT

## 2021-09-30 ENCOUNTER — HOSPITAL ENCOUNTER (OUTPATIENT)
Dept: RADIOLOGY | Facility: MEDICAL CENTER | Age: 76
End: 2021-09-30
Attending: FAMILY MEDICINE
Payer: MEDICARE

## 2021-09-30 DIAGNOSIS — Z12.31 ENCOUNTER FOR SCREENING MAMMOGRAM FOR MALIGNANT NEOPLASM OF BREAST: ICD-10-CM

## 2021-11-18 ENCOUNTER — HOSPITAL ENCOUNTER (OUTPATIENT)
Dept: RADIOLOGY | Facility: MEDICAL CENTER | Age: 76
End: 2021-11-18
Attending: FAMILY MEDICINE
Payer: MEDICARE

## 2021-11-18 PROCEDURE — 77063 BREAST TOMOSYNTHESIS BI: CPT

## 2022-01-25 ENCOUNTER — HOSPITAL ENCOUNTER (OUTPATIENT)
Dept: RADIOLOGY | Facility: MEDICAL CENTER | Age: 77
End: 2022-01-25
Attending: OTOLARYNGOLOGY
Payer: MEDICARE

## 2022-01-25 DIAGNOSIS — J34.2 DEVIATED NASAL SEPTUM: ICD-10-CM

## 2022-01-25 DIAGNOSIS — J32.0 CHRONIC MAXILLARY SINUSITIS: ICD-10-CM

## 2022-01-25 DIAGNOSIS — J31.0 CHRONIC RHINITIS: ICD-10-CM

## 2022-01-25 PROCEDURE — 70486 CT MAXILLOFACIAL W/O DYE: CPT | Mod: MH

## 2022-11-11 ENCOUNTER — PATIENT MESSAGE (OUTPATIENT)
Dept: HEALTH INFORMATION MANAGEMENT | Facility: OTHER | Age: 77
End: 2022-11-11

## 2023-11-29 ENCOUNTER — PATIENT MESSAGE (OUTPATIENT)
Dept: HEALTH INFORMATION MANAGEMENT | Facility: OTHER | Age: 78
End: 2023-11-29

## 2024-02-17 ENCOUNTER — OFFICE VISIT (OUTPATIENT)
Dept: URGENT CARE | Facility: CLINIC | Age: 79
End: 2024-02-17
Payer: MEDICARE

## 2024-02-17 VITALS
RESPIRATION RATE: 16 BRPM | SYSTOLIC BLOOD PRESSURE: 150 MMHG | HEART RATE: 88 BPM | OXYGEN SATURATION: 97 % | BODY MASS INDEX: 29.71 KG/M2 | WEIGHT: 174 LBS | HEIGHT: 64 IN | TEMPERATURE: 97.7 F | DIASTOLIC BLOOD PRESSURE: 80 MMHG

## 2024-02-17 DIAGNOSIS — R50.9 FEVER, UNSPECIFIED FEVER CAUSE: Primary | ICD-10-CM

## 2024-02-17 DIAGNOSIS — J01.90 ACUTE BACTERIAL RHINOSINUSITIS: ICD-10-CM

## 2024-02-17 DIAGNOSIS — I10 PRIMARY HYPERTENSION: ICD-10-CM

## 2024-02-17 DIAGNOSIS — B96.89 ACUTE BACTERIAL RHINOSINUSITIS: ICD-10-CM

## 2024-02-17 DIAGNOSIS — R05.1 ACUTE COUGH: ICD-10-CM

## 2024-02-17 LAB
FLUAV RNA SPEC QL NAA+PROBE: NEGATIVE
FLUBV RNA SPEC QL NAA+PROBE: NEGATIVE
RSV RNA SPEC QL NAA+PROBE: NEGATIVE
SARS-COV-2 RNA RESP QL NAA+PROBE: NEGATIVE

## 2024-02-17 PROCEDURE — 0241U POCT CEPHEID COV-2, FLU A/B, RSV - PCR: CPT

## 2024-02-17 PROCEDURE — 99204 OFFICE O/P NEW MOD 45 MIN: CPT

## 2024-02-17 PROCEDURE — 3079F DIAST BP 80-89 MM HG: CPT

## 2024-02-17 PROCEDURE — 3077F SYST BP >= 140 MM HG: CPT

## 2024-02-17 RX ORDER — LOSARTAN POTASSIUM 100 MG/1
TABLET ORAL
COMMUNITY

## 2024-02-17 RX ORDER — FLUTICASONE PROPIONATE 50 MCG
SPRAY, SUSPENSION (ML) NASAL
COMMUNITY
Start: 2024-01-03

## 2024-02-17 RX ORDER — AMOXICILLIN AND CLAVULANATE POTASSIUM 875; 125 MG/1; MG/1
1 TABLET, FILM COATED ORAL 2 TIMES DAILY
Qty: 20 TABLET | Refills: 0 | Status: SHIPPED | OUTPATIENT
Start: 2024-02-17 | End: 2024-02-27

## 2024-02-17 ASSESSMENT — ENCOUNTER SYMPTOMS
SWOLLEN GLANDS: 0
HOARSE VOICE: 0

## 2024-02-17 NOTE — PATIENT INSTRUCTIONS
"As we discussed your clinical condition would benefit from over-the-counter remedies:    Congestion:    Nasal rinsing with saline nasal spray or salt water (e.g., neti pot) can help relieve nasal dryness.    Breathe Right nasal strips at night for nasal congestion    Ponaris nasal emmollient for nasal congestion, dryness, and inflammation (do not use with iodine sensitivity)    Cool mist humidification, chest rubs, warm tea with honey, increased fluid intake to thin secretions    SALINE IRRIGATION/SPRAY 2 to 3 times per day    You may consider using a saline nasal irrigation (such as a \"Neti pot\" or similar device using sterile water, NOT tap water) or OTC saline nasal spray. Common brands include Kem Med, Sinex, [place] Nasal. May use short term nasal sprays, such as oxymetazoline (Afrin) to help relieve nasal discomfort, congestion, and/or pressure. Decongestant sprays should not be used longer than three consecutive days.     Over the counter medications:    OTC second generation antihistamine daily (cetirizine, desloratadine, fexofenadine, levocetirizine, and loratadine) daily IN COMBINATION WITH:    FLONASE (once per day) x 2 weeks     You may consider intranasal steroids such as fluticasone (brand name Flonase), (other options include Nasonex, Rhinocort, Nasacort) daily; continue for at least 2-3 weeks. Any generic should work as well but may cause slightly more nasal irritation. Please take according to package directions.  This steroid will help reduce inflammation of your sinuses.  This is the number one medication to help with seasonal allergies and treat nasal inflammation.  Cost: around $8 at [facility] for the generic fluticasone [facility] product (as of 5/20).    SORE THROAT:    Symptom management for a sore throat includes:     Sipping cold or warm beverages (eg, tea with honey or lemon)     Eating cold or frozen desserts (eg, ice cream, popsicles)    Sucking on ice    Sucking on hard candy, CEPACOL " lozenges, or medicated throat sprays. These contain the active ingredient benzocaine which is an anesthetic agent.  It helps relieve the symptoms of sore throat and may diminish your cough reflex.Please note that taking too frequently may cause harm - pay attention to package directions.    Gargling with warm salt water -  ¼ to ½ teaspoon of salt per 8 ounces (approximately 240 mL) of warm water.  Cool mist humidification  OTC Tylenol/ibuprofen PRN for pain/fever     PAIN OR FEVER:    TYLENOL  You may take Tylenol according to package directions (1000 mg every 8 hours not to exceed 3000 mg per day).  Please do not consume with any alcohol products, or if you have known or suspected liver disease. These will help reduce inflammation, help with pain control, and can reduce fevers.

## 2024-02-17 NOTE — PROGRESS NOTES
"Subjective:   Berta Cohen is a very pleasant 78 y.o. female who presents for:    Chief Complaint   Patient presents with    Sinus Problem     X 4 days, sinus congestion, history of sinus problems, fever, body aches, sore throat, cough.       HPI:    Sinus Problem  Episode onset: four days ago, the patient developed sinus congestion. Yesterdat, she developed fever, myalgias, cough, and sore throat. Pertinent negatives include no hoarse voice, sneezing or swollen glands. (RS sinus pain, pressure behind right eye, RS ear pain) Treatments tried: Flonase daily. The treatment provided no relief.     Hypertension:    She was seen by her PCP six weeks ago due to elevated BP readings at home. No changes were made regarding treatment/medication dosing. Over the past six weeks. she reports that her BP readings are \"variable\" with SBP ranging from 130s-160s and DBP ranging from the 70's-90s. No headache, nausea, dizziness, tinnitus, blurred vision, chest pain, LE edema. She is compliant with her BP medication regimen.    ROS:    Review of Systems   HENT:  Negative for hoarse voice and sneezing.        Medications:      Current Outpatient Medications   Medication Sig    fluticasone (FLONASE) 50 MCG/ACT nasal spray 1 spray in each nostril Nasally Once a day for 90 day(s)    vitamin D (CHOLECALCIFEROL) 1000 UNIT Tab Take 1,000 Units by mouth every day.    Flax Oil Take 1 Cap by mouth every day.    estradiol (ESTRACE) 0.5 MG tablet Take 0.5 mg by mouth every day.    medroxyPROGESTERone (PROVERA) 2.5 MG Tab Take 2.5 mg by mouth every day.    Misc Natural Products (TURMERIC CURCUMIN) Cap Take 1 Cap by mouth every day.    CALCIUM PO Take 1 Tab by mouth every day.    amlodipine (NORVASC) 5 MG TABS Take 5 mg by mouth every day.    losartan (COZAAR) 100 MG TABS Take 100 mg by mouth every day.    losartan (COZAAR) 100 MG Tab Losartan Potassium (Patient not taking: Reported on 2/17/2024)    omeprazole (PRILOSEC) 20 MG " delayed-release capsule Take 2 Caps by mouth every day. (Patient not taking: Reported on 2/17/2024)    oxycodone-acetaminophen (PERCOCET-10)  MG Tab Take 1-2 Tabs by mouth every four hours as needed for Severe Pain. (Patient not taking: Reported on 2/17/2024)       Allergies:     Allergies   Allergen Reactions    Hmg-Coa-R Inhibitors      Other reaction(s): Other (See Comments)  Severe myalgia    Neomycin-Bacitracin-Polymyxin Rash    Ipratropium Unspecified    Neosporin [Neomycin-Polymyxin B]      Per pt cant use for more than 5 days or gets red rash.    Rosuvastatin Calcium        Problem List:     Patient Active Problem List   Diagnosis    Lower urinary tract infectious disease    Vertigo    Intractable nausea and vomiting    Renal insufficiency    HTN (hypertension)    Emesis    Insomnia    CKD (chronic kidney disease) stage 3, GFR 30-59 ml/min (HCC)    Left-sided thoracic back pain    Lung nodule < 6cm on CT    Thyroid nodule    Dyslipidemia       Surgical History:    Past Surgical History:   Procedure Laterality Date    OTHER ORTHOPEDIC SURGERY  2014    R big toe joint replacement    GYN SURGERY      tubal ligation    OTHER ABDOMINAL SURGERY      Burst appendix in 1970s    TONSILLECTOMY         Past Social Hx:     Social History     Socioeconomic History    Marital status:    Tobacco Use    Smoking status: Never    Smokeless tobacco: Never   Vaping Use    Vaping Use: Never used   Substance and Sexual Activity    Alcohol use: Yes     Comment: Rarely    Drug use: No        Past Family Hx:      Family History   Problem Relation Age of Onset    Other Father         aortic aneurysm rupture age 73.       Problem list, medications, and allergies reviewed by myself today in Epic.     Objective:     Vitals:    02/17/24 0927   BP: (!) 150/80   Pulse: 88   Resp: 16   Temp: 36.5 °C (97.7 °F)   SpO2: 97%       Physical Exam  Vitals reviewed.   Constitutional:       General: She is not in acute distress.      Appearance: Normal appearance. She is not ill-appearing, toxic-appearing or diaphoretic.   HENT:      Head: Normocephalic and atraumatic.      Right Ear: Tympanic membrane, ear canal and external ear normal.      Left Ear: Tympanic membrane, ear canal and external ear normal.      Nose: Congestion and rhinorrhea present.      Right Sinus: Maxillary sinus tenderness present. No frontal sinus tenderness.      Left Sinus: No maxillary sinus tenderness or frontal sinus tenderness.      Mouth/Throat:      Mouth: Mucous membranes are moist.      Pharynx: Oropharynx is clear.   Eyes:      Extraocular Movements: Extraocular movements intact.      Conjunctiva/sclera: Conjunctivae normal.      Pupils: Pupils are equal, round, and reactive to light.   Cardiovascular:      Rate and Rhythm: Normal rate and regular rhythm.      Pulses: Normal pulses.      Heart sounds: Normal heart sounds.   Pulmonary:      Effort: Pulmonary effort is normal.      Breath sounds: Normal breath sounds.   Abdominal:      General: Abdomen is flat.      Palpations: Abdomen is soft.   Musculoskeletal:         General: Normal range of motion.      Cervical back: Normal range of motion.   Skin:     General: Skin is warm and dry.   Neurological:      General: No focal deficit present.      Mental Status: She is alert and oriented to person, place, and time.   Psychiatric:         Mood and Affect: Mood normal.         Behavior: Behavior normal.         Thought Content: Thought content normal.       Results from POCT:   Results for orders placed or performed in visit on 02/17/24   POCT CoV-2, Flu A/B, RSV by PCR   Result Value Ref Range    SARS-CoV-2 by PCR Negative Negative, Invalid    Influenza virus A RNA Negative Negative, Invalid    Influenza virus B, PCR Negative Negative, Invalid    RSV, PCR Negative Negative, Invalid       Assessment/Plan:     Diagnosis and associated orders:     1. Acute bacterial rhinosinusitis  - amoxicillin-clavulanate  (AUGMENTIN) 875-125 MG Tab; Take 1 Tablet by mouth 2 times a day for 10 days.  Dispense: 20 Tablet; Refill: 0    2. Fever, unspecified fever cause  - POCT CoV-2, Flu A/B, RSV by PCR    3. Acute cough  - POCT CoV-2, Flu A/B, RSV by PCR    4. Primary hypertension        Comments/MDM:     POCT COVID, flu, RSV negative  Based on patient's symptoms, symptom duration, and physical exam findings, it was discussed with patient that the likely etiology of the illness is viral.   Due to patient's past medical history significant for bacterial sinusitis as well as sinus surgery, contingent prescription for Augmentin sent to patient's preferred pharmacy for the treatment of acute bacterial rhinosinusitis.  If the patient develops significantly worsening sinus pain and pressure over the next 48 to 72 hours despite supportive measures, she was encouraged to start the antibiotic.  Encouraged patient to complete the entire course of antibiotics  Recommend symptomatic care:    OTC second generation antihistamine daily (cetirizine, desloratadine, fexofenadine, levocetirizine, and loratadine) daily IN COMBINATION WITH:  OTC decongestant (Sudafed - Pseudoephedrine) unless contraindication in place, such as hypertension, CAD, narrow-angle glaucoma. Use with caution if the patient has a history of cardiac dysrhythmias, hyperthyroidism, DM, prostatic hypertrophy, and glaucoma should use with caution.  May take Coricidin HBP for individuals with high blood pressure.  Intranasal fluticasone (Flonase) daily    Nasal saline rinses 2-3 times a day   May use short term nasal sprays, such as oxymetazoline (Afrin) to help relieve nasal discomfort, congestion, and/or pressure. Decongestant sprays should not be used longer than three consecutive days.   Nasal rinsing with saline nasal spray or salt water (e.g., neti pot) can help relieve nasal dryness.  Breathe Right nasal strips at night for nasal congestion  Ponaris nasal emmollient for nasal  congestion, dryness, and inflammation (do not use with iodine sensitivity)  Cool mist humidification, chest rubs, warm tea with honey, increased fluid intake to thin secretions  Tylenol or ibuprofen as needed for fever control, body aches, and headaches.  Patient hypertensive in clinic at 150/80.  She is asymptomatic.  Encouraged patient to engage in twice daily blood pressure monitoring.  Log printed and provided to patient.  Educated patient on proper use of blood pressure cuff as well as how to properly take her blood pressure.  I advised her to call her primary care provider within the next 72 hours to set up an appointment.  Advised to bring blood pressure log and blood pressure cuff to ensure that the cuff is accurate and that she is taking her blood pressure properly.  Signs of hypertensive emergency discussed with patient.  She was advised to go to ER if she experiences any symptoms associated with hypertensive emergency.      If symptoms fail to improve within 72 hours, new symptoms develop, symptoms worsen return to clinic or see PCP for re-evaluation.   Time I spent evaluating the patient in urgent care today was 44 minutes. This time includes preparing for visit, reviewing any pertinent notes or test results, counseling/education, exam, obtaining HPI, interpretation of lab tests, medication management and documentation as indicated above. Time does not include separately billable procedures noted.             All questions answered. Patient verbalized understanding and is in agreement with this plan of care.     If symptoms are worsening or not improving in 3-5 days, follow-up with PCP or return to UC. Differential diagnosis, natural history, and supportive care discussed. AVS handout given and reviewed with patient. Patient educated on red flags and when to seek treatment back in ED or UC.     I personally reviewed prior external notes and test results pertinent to today's visit.  I have independently  reviewed and interpreted all diagnostics ordered during this urgent care visit.     This dictation has been created using voice recognition software. The accuracy of the dictation is limited by the abilities of the software. I expect there may be some errors of grammar and possibly content. I made every attempt to manually correct the errors within my dictation. However, errors related to voice recognition software may still exist and should be interpreted within the appropriate context.    This note was electronically signed by FLORECITA Kay

## 2024-03-12 ENCOUNTER — HOSPITAL ENCOUNTER (OUTPATIENT)
Dept: LAB | Facility: MEDICAL CENTER | Age: 79
End: 2024-03-12
Attending: INTERNAL MEDICINE
Payer: MEDICARE

## 2024-03-12 LAB
ALBUMIN SERPL BCP-MCNC: 4.4 G/DL (ref 3.2–4.9)
BUN SERPL-MCNC: 42 MG/DL (ref 8–22)
CALCIUM ALBUM COR SERPL-MCNC: 9.6 MG/DL (ref 8.5–10.5)
CALCIUM SERPL-MCNC: 9.9 MG/DL (ref 8.5–10.5)
CHLORIDE SERPL-SCNC: 105 MMOL/L (ref 96–112)
CO2 SERPL-SCNC: 18 MMOL/L (ref 20–33)
CREAT SERPL-MCNC: 1.49 MG/DL (ref 0.5–1.4)
FASTING STATUS PATIENT QL REPORTED: NORMAL
GFR SERPLBLD CREATININE-BSD FMLA CKD-EPI: 35 ML/MIN/1.73 M 2
GLUCOSE SERPL-MCNC: 92 MG/DL (ref 65–99)
PHOSPHATE SERPL-MCNC: 4 MG/DL (ref 2.5–4.5)
POTASSIUM SERPL-SCNC: 4.5 MMOL/L (ref 3.6–5.5)
SODIUM SERPL-SCNC: 137 MMOL/L (ref 135–145)

## 2024-03-12 PROCEDURE — 80069 RENAL FUNCTION PANEL: CPT

## 2024-03-12 PROCEDURE — 36415 COLL VENOUS BLD VENIPUNCTURE: CPT

## 2024-04-12 ENCOUNTER — HOSPITAL ENCOUNTER (OUTPATIENT)
Dept: LAB | Facility: MEDICAL CENTER | Age: 79
End: 2024-04-12
Payer: MEDICARE

## 2024-04-12 LAB
ALBUMIN SERPL BCP-MCNC: 4.2 G/DL (ref 3.2–4.9)
ALP SERPL-CCNC: 50 U/L (ref 30–99)
ALT SERPL-CCNC: 8 U/L (ref 2–50)
AST SERPL-CCNC: 18 U/L (ref 12–45)
BASOPHILS # BLD AUTO: 0.7 % (ref 0–1.8)
BASOPHILS # BLD: 0.04 K/UL (ref 0–0.12)
BILIRUB CONJ SERPL-MCNC: <0.2 MG/DL (ref 0.1–0.5)
BILIRUB INDIRECT SERPL-MCNC: NORMAL MG/DL (ref 0–1)
BILIRUB SERPL-MCNC: 0.4 MG/DL (ref 0.1–1.5)
EOSINOPHIL # BLD AUTO: 0.25 K/UL (ref 0–0.51)
EOSINOPHIL NFR BLD: 4.5 % (ref 0–6.9)
ERYTHROCYTE [DISTWIDTH] IN BLOOD BY AUTOMATED COUNT: 47.8 FL (ref 35.9–50)
FERRITIN SERPL-MCNC: 39.1 NG/ML (ref 10–291)
HCT VFR BLD AUTO: 34.4 % (ref 37–47)
HGB BLD-MCNC: 11.5 G/DL (ref 12–16)
IMM GRANULOCYTES # BLD AUTO: 0.02 K/UL (ref 0–0.11)
IMM GRANULOCYTES NFR BLD AUTO: 0.4 % (ref 0–0.9)
IRON SATN MFR SERPL: 31 % (ref 15–55)
IRON SERPL-MCNC: 77 UG/DL (ref 40–170)
LYMPHOCYTES # BLD AUTO: 1.19 K/UL (ref 1–4.8)
LYMPHOCYTES NFR BLD: 21.4 % (ref 22–41)
MCH RBC QN AUTO: 32 PG (ref 27–33)
MCHC RBC AUTO-ENTMCNC: 33.4 G/DL (ref 32.2–35.5)
MCV RBC AUTO: 95.8 FL (ref 81.4–97.8)
MONOCYTES # BLD AUTO: 0.52 K/UL (ref 0–0.85)
MONOCYTES NFR BLD AUTO: 9.3 % (ref 0–13.4)
NEUTROPHILS # BLD AUTO: 3.55 K/UL (ref 1.82–7.42)
NEUTROPHILS NFR BLD: 63.7 % (ref 44–72)
NRBC # BLD AUTO: 0 K/UL
NRBC BLD-RTO: 0 /100 WBC (ref 0–0.2)
PLATELET # BLD AUTO: 215 K/UL (ref 164–446)
PMV BLD AUTO: 11.4 FL (ref 9–12.9)
PROT SERPL-MCNC: 6.4 G/DL (ref 6–8.2)
RBC # BLD AUTO: 3.59 M/UL (ref 4.2–5.4)
TIBC SERPL-MCNC: 247 UG/DL (ref 250–450)
UIBC SERPL-MCNC: 170 UG/DL (ref 110–370)
WBC # BLD AUTO: 5.6 K/UL (ref 4.8–10.8)

## 2024-04-12 PROCEDURE — 85025 COMPLETE CBC W/AUTO DIFF WBC: CPT

## 2024-04-12 PROCEDURE — 80076 HEPATIC FUNCTION PANEL: CPT

## 2024-04-12 PROCEDURE — 83550 IRON BINDING TEST: CPT

## 2024-04-12 PROCEDURE — 36415 COLL VENOUS BLD VENIPUNCTURE: CPT

## 2024-04-12 PROCEDURE — 82728 ASSAY OF FERRITIN: CPT

## 2024-04-12 PROCEDURE — 83540 ASSAY OF IRON: CPT

## 2024-05-07 ENCOUNTER — OFFICE VISIT (OUTPATIENT)
Dept: CARDIOLOGY | Facility: MEDICAL CENTER | Age: 79
End: 2024-05-07
Attending: INTERNAL MEDICINE
Payer: MEDICARE

## 2024-05-07 VITALS
RESPIRATION RATE: 14 BRPM | WEIGHT: 177 LBS | SYSTOLIC BLOOD PRESSURE: 126 MMHG | HEART RATE: 75 BPM | BODY MASS INDEX: 30.22 KG/M2 | HEIGHT: 64 IN | OXYGEN SATURATION: 98 % | DIASTOLIC BLOOD PRESSURE: 78 MMHG

## 2024-05-07 DIAGNOSIS — I10 PRIMARY HYPERTENSION: ICD-10-CM

## 2024-05-07 DIAGNOSIS — I12.9 HYPERTENSIVE CHRONIC KIDNEY DISEASE WITH STAGE 1 THROUGH STAGE 4 CHRONIC KIDNEY DISEASE, OR UNSPECIFIED CHRONIC KIDNEY DISEASE: ICD-10-CM

## 2024-05-07 DIAGNOSIS — I25.10 CORONARY ARTERY CALCIFICATION SEEN ON CAT SCAN: ICD-10-CM

## 2024-05-07 DIAGNOSIS — E78.5 DYSLIPIDEMIA: ICD-10-CM

## 2024-05-07 DIAGNOSIS — E83.110 HEREDITARY HEMOCHROMATOSIS (HCC): ICD-10-CM

## 2024-05-07 PROCEDURE — 99204 OFFICE O/P NEW MOD 45 MIN: CPT | Performed by: INTERNAL MEDICINE

## 2024-05-07 PROCEDURE — 3074F SYST BP LT 130 MM HG: CPT | Performed by: INTERNAL MEDICINE

## 2024-05-07 PROCEDURE — 3078F DIAST BP <80 MM HG: CPT | Performed by: INTERNAL MEDICINE

## 2024-05-07 RX ORDER — CARVEDILOL 6.25 MG/1
6.25 TABLET ORAL 2 TIMES DAILY
COMMUNITY
Start: 2024-04-22

## 2024-05-07 ASSESSMENT — FIBROSIS 4 INDEX: FIB4 SCORE: 2.37

## 2024-05-07 NOTE — PROGRESS NOTES
Chief Complaint   Patient presents with    New Patient     N/P Dx: Hypertensive chronic kidney disease with stage 1 through stage 4 chronic kidney disease, or unspecified chronic kidney disease       Subjective     Berta Cohen is a 78 y.o. female who presents today  in consultation from Abdirashid Gayle. for evaluation of heart health in setting of CKD and HTN    Doing well no caridac complaints hemachromotosis treated with phlebotomy has had CKD since her 50s with NSAID use at the time    Past Medical History:   Diagnosis Date    Arthritis      Past Surgical History:   Procedure Laterality Date    OTHER ORTHOPEDIC SURGERY  2014    R big toe joint replacement    GYN SURGERY      tubal ligation    OTHER ABDOMINAL SURGERY      Burst appendix in 1970s    TONSILLECTOMY       Family History   Problem Relation Age of Onset    Heart Disease Mother     Kidney Disease Mother     Other Father         aortic aneurysm rupture age 73.    Arrythmia Brother      Social History     Socioeconomic History    Marital status:      Spouse name: Not on file    Number of children: Not on file    Years of education: Not on file    Highest education level: Not on file   Occupational History    Not on file   Tobacco Use    Smoking status: Never    Smokeless tobacco: Never   Vaping Use    Vaping Use: Never used   Substance and Sexual Activity    Alcohol use: Yes     Comment: Rarely    Drug use: No    Sexual activity: Not on file   Other Topics Concern    Not on file   Social History Narrative    Not on file     Social Determinants of Health     Financial Resource Strain: Not on file   Food Insecurity: Not on file   Transportation Needs: Not on file   Physical Activity: Not on file   Stress: Not on file   Social Connections: Not on file   Intimate Partner Violence: Not on file   Housing Stability: Not on file     Allergies   Allergen Reactions    Hmg-Coa-R Inhibitors      Other reaction(s): Other (See Comments)  Severe myalgia  "   Neomycin-Bacitracin-Polymyxin Rash    Atorvastatin     Ipratropium Unspecified    Neosporin [Neomycin-Polymyxin B]      Per pt cant use for more than 5 days or gets red rash.    Rosuvastatin Calcium      Outpatient Encounter Medications as of 5/7/2024   Medication Sig Dispense Refill    carvedilol (COREG) 6.25 MG Tab Take 6.25 mg by mouth 2 times a day.      Acetaminophen (TYLENOL PO) Take  by mouth.      losartan (COZAAR) 100 MG Tab       omeprazole (PRILOSEC) 20 MG delayed-release capsule Take 2 Caps by mouth every day. 30 Cap 0    vitamin D (CHOLECALCIFEROL) 1000 UNIT Tab Take 1,000 Units by mouth every day.      Flax Oil Take 1 Cap by mouth every day.      estradiol (ESTRACE) 0.5 MG tablet Take 0.5 mg by mouth every day.      medroxyPROGESTERone (PROVERA) 2.5 MG Tab Take 2.5 mg by mouth every day.      Misc Natural Products (TURMERIC CURCUMIN) Cap Take 1 Cap by mouth every day.      CALCIUM PO Take 1 Tab by mouth every day.      amlodipine (NORVASC) 5 MG TABS Take 5 mg by mouth every day.      losartan (COZAAR) 100 MG TABS Take 100 mg by mouth every day.      fluticasone (FLONASE) 50 MCG/ACT nasal spray 1 spray in each nostril Nasally Once a day for 90 day(s) (Patient not taking: Reported on 5/7/2024)      oxycodone-acetaminophen (PERCOCET-10)  MG Tab Take 1-2 Tablets by mouth every four hours as needed for Severe Pain. (Patient not taking: Reported on 5/7/2024)       No facility-administered encounter medications on file as of 5/7/2024.     ROS           Objective     BP (!) 0/0 (BP Location: Left arm, Patient Position: Sitting, BP Cuff Size: Adult)   Resp 14   Ht 1.626 m (5' 4\")   Wt 80.3 kg (177 lb)   BMI 30.38 kg/m²     Physical Exam  Constitutional:       General: She is not in acute distress.     Appearance: She is not diaphoretic.   Eyes:      General: No scleral icterus.  Neck:      Vascular: No JVD.   Cardiovascular:      Rate and Rhythm: Normal rate.      Heart sounds: Normal heart sounds. " No murmur heard.     No friction rub. No gallop.   Pulmonary:      Effort: No respiratory distress.      Breath sounds: No wheezing or rales.   Abdominal:      General: Bowel sounds are normal.      Palpations: Abdomen is soft.   Musculoskeletal:      Right lower leg: No edema.      Left lower leg: No edema.   Skin:     Findings: No rash.   Neurological:      Mental Status: She is alert. Mental status is at baseline.   Psychiatric:         Mood and Affect: Mood normal.        Cr 1.5 GFR 32        Last stress in 2017 inpatient was normal      Assessment & Plan     1. Hypertensive chronic kidney disease with stage 1 through stage 4 chronic kidney disease, or unspecified chronic kidney disease  EKG      2. Dyslipidemia        3. Primary hypertension        4. Hereditary hemochromatosis (HCC)  EC-ECHOCARDIOGRAM COMPLETE W/O CONT      5. Coronary artery calcification seen on CAT scan            Medical Decision Making: Today's Assessment/Status/Plan:      It was my pleasure to meet with Ms. Cohen.    We addressed the management of hypertension at today's visit. Blood pressure is well controlled.  We specifically assessed the labs on hypertension treatment    Statin intolerant    WILL DO AN ECHOCARDIOGRAM TO UPDATE WITH HEMACHROMATOSIS    We will follow up with Ms. Cohen [facility] the [facility]. We will determine further follow-up from there.    It is my pleasure to participate in the care of Ms. Cohen.  Please do not hesitate to contact me with questions or concerns.    Gil Potter MD PhD [facility]  Cardiologist [facility]    Please note that this dictation was created using voice recognition software. There may be errors I did not discover before finalizing the note.     5/7/2024  8:11 AM

## 2024-05-08 LAB — EKG IMPRESSION: NORMAL

## 2024-05-08 PROCEDURE — 93010 ELECTROCARDIOGRAM REPORT: CPT | Performed by: INTERNAL MEDICINE

## 2024-07-01 ENCOUNTER — HOSPITAL ENCOUNTER (OUTPATIENT)
Dept: CARDIOLOGY | Facility: MEDICAL CENTER | Age: 79
End: 2024-07-01
Attending: INTERNAL MEDICINE
Payer: MEDICARE

## 2024-07-01 DIAGNOSIS — E83.110 HEREDITARY HEMOCHROMATOSIS (HCC): ICD-10-CM

## 2024-07-01 LAB
LV EJECT FRACT  99904: 69
LV EJECT FRACT MOD 2C 99903: 64.61
LV EJECT FRACT MOD 4C 99902: 77.41
LV EJECT FRACT MOD BP 99901: 69.64

## 2024-07-01 PROCEDURE — 93306 TTE W/DOPPLER COMPLETE: CPT

## 2024-07-01 PROCEDURE — 93306 TTE W/DOPPLER COMPLETE: CPT | Mod: 26 | Performed by: INTERNAL MEDICINE

## 2024-07-12 ENCOUNTER — HOSPITAL ENCOUNTER (OUTPATIENT)
Dept: LAB | Facility: MEDICAL CENTER | Age: 79
End: 2024-07-12
Payer: MEDICARE

## 2024-07-12 LAB
ALBUMIN SERPL BCP-MCNC: 4.2 G/DL (ref 3.2–4.9)
APPEARANCE UR: CLEAR
BACTERIA #/AREA URNS HPF: NEGATIVE /HPF
BASOPHILS # BLD AUTO: 0.5 % (ref 0–1.8)
BASOPHILS # BLD AUTO: 0.5 % (ref 0–1.8)
BASOPHILS # BLD: 0.03 K/UL (ref 0–0.12)
BASOPHILS # BLD: 0.03 K/UL (ref 0–0.12)
BILIRUB UR QL STRIP.AUTO: NEGATIVE
BUN SERPL-MCNC: 31 MG/DL (ref 8–22)
CALCIUM ALBUM COR SERPL-MCNC: 9.7 MG/DL (ref 8.5–10.5)
CALCIUM SERPL-MCNC: 9.9 MG/DL (ref 8.5–10.5)
CHLORIDE SERPL-SCNC: 105 MMOL/L (ref 96–112)
CO2 SERPL-SCNC: 20 MMOL/L (ref 20–33)
COLOR UR: YELLOW
CREAT SERPL-MCNC: 1.66 MG/DL (ref 0.5–1.4)
CREAT UR-MCNC: 84.03 MG/DL
EOSINOPHIL # BLD AUTO: 0.17 K/UL (ref 0–0.51)
EOSINOPHIL # BLD AUTO: 0.19 K/UL (ref 0–0.51)
EOSINOPHIL NFR BLD: 2.7 % (ref 0–6.9)
EOSINOPHIL NFR BLD: 3.1 % (ref 0–6.9)
EPI CELLS #/AREA URNS HPF: NEGATIVE /HPF
ERYTHROCYTE [DISTWIDTH] IN BLOOD BY AUTOMATED COUNT: 46 FL (ref 35.9–50)
ERYTHROCYTE [DISTWIDTH] IN BLOOD BY AUTOMATED COUNT: 46.4 FL (ref 35.9–50)
FERRITIN SERPL-MCNC: 42.1 NG/ML (ref 10–291)
GFR SERPLBLD CREATININE-BSD FMLA CKD-EPI: 31 ML/MIN/1.73 M 2
GLUCOSE SERPL-MCNC: 90 MG/DL (ref 65–99)
GLUCOSE UR STRIP.AUTO-MCNC: NEGATIVE MG/DL
HCT VFR BLD AUTO: 37.6 % (ref 37–47)
HCT VFR BLD AUTO: 38.1 % (ref 37–47)
HGB BLD-MCNC: 12.7 G/DL (ref 12–16)
HGB BLD-MCNC: 12.8 G/DL (ref 12–16)
HYALINE CASTS #/AREA URNS LPF: ABNORMAL /LPF
IMM GRANULOCYTES # BLD AUTO: 0.05 K/UL (ref 0–0.11)
IMM GRANULOCYTES # BLD AUTO: 0.06 K/UL (ref 0–0.11)
IMM GRANULOCYTES NFR BLD AUTO: 0.8 % (ref 0–0.9)
IMM GRANULOCYTES NFR BLD AUTO: 1 % (ref 0–0.9)
IRON SATN MFR SERPL: 50 % (ref 15–55)
IRON SERPL-MCNC: 132 UG/DL (ref 40–170)
KETONES UR STRIP.AUTO-MCNC: NEGATIVE MG/DL
LEUKOCYTE ESTERASE UR QL STRIP.AUTO: ABNORMAL
LYMPHOCYTES # BLD AUTO: 1.18 K/UL (ref 1–4.8)
LYMPHOCYTES # BLD AUTO: 1.2 K/UL (ref 1–4.8)
LYMPHOCYTES NFR BLD: 19.2 % (ref 22–41)
LYMPHOCYTES NFR BLD: 19.3 % (ref 22–41)
MCH RBC QN AUTO: 31.8 PG (ref 27–33)
MCH RBC QN AUTO: 32.5 PG (ref 27–33)
MCHC RBC AUTO-ENTMCNC: 33.3 G/DL (ref 32.2–35.5)
MCHC RBC AUTO-ENTMCNC: 34 G/DL (ref 32.2–35.5)
MCV RBC AUTO: 95.3 FL (ref 81.4–97.8)
MCV RBC AUTO: 95.4 FL (ref 81.4–97.8)
MICRO URNS: ABNORMAL
MONOCYTES # BLD AUTO: 0.53 K/UL (ref 0–0.85)
MONOCYTES # BLD AUTO: 0.61 K/UL (ref 0–0.85)
MONOCYTES NFR BLD AUTO: 8.6 % (ref 0–13.4)
MONOCYTES NFR BLD AUTO: 9.8 % (ref 0–13.4)
NEUTROPHILS # BLD AUTO: 4.14 K/UL (ref 1.82–7.42)
NEUTROPHILS # BLD AUTO: 4.15 K/UL (ref 1.82–7.42)
NEUTROPHILS NFR BLD: 66.9 % (ref 44–72)
NEUTROPHILS NFR BLD: 67.6 % (ref 44–72)
NITRITE UR QL STRIP.AUTO: NEGATIVE
NRBC # BLD AUTO: 0 K/UL
NRBC # BLD AUTO: 0 K/UL
NRBC BLD-RTO: 0 /100 WBC (ref 0–0.2)
NRBC BLD-RTO: 0 /100 WBC (ref 0–0.2)
PH UR STRIP.AUTO: 6 [PH] (ref 5–8)
PHOSPHATE SERPL-MCNC: 3.8 MG/DL (ref 2.5–4.5)
PLATELET # BLD AUTO: 176 K/UL (ref 164–446)
PLATELET # BLD AUTO: 182 K/UL (ref 164–446)
PMV BLD AUTO: 11.3 FL (ref 9–12.9)
PMV BLD AUTO: 11.5 FL (ref 9–12.9)
POTASSIUM SERPL-SCNC: 5.2 MMOL/L (ref 3.6–5.5)
PROT UR QL STRIP: 100 MG/DL
PROT UR-MCNC: 79 MG/DL (ref 0–15)
PROT/CREAT UR: 940 MG/G (ref 10–107)
RBC # BLD AUTO: 3.94 M/UL (ref 4.2–5.4)
RBC # BLD AUTO: 4 M/UL (ref 4.2–5.4)
RBC # URNS HPF: ABNORMAL /HPF
RBC UR QL AUTO: NEGATIVE
SODIUM SERPL-SCNC: 136 MMOL/L (ref 135–145)
SP GR UR STRIP.AUTO: 1.02
TIBC SERPL-MCNC: 265 UG/DL (ref 250–450)
UIBC SERPL-MCNC: 133 UG/DL (ref 110–370)
UROBILINOGEN UR STRIP.AUTO-MCNC: 0.2 MG/DL
WBC # BLD AUTO: 6.1 K/UL (ref 4.8–10.8)
WBC # BLD AUTO: 6.2 K/UL (ref 4.8–10.8)
WBC #/AREA URNS HPF: ABNORMAL /HPF

## 2024-07-12 PROCEDURE — 36415 COLL VENOUS BLD VENIPUNCTURE: CPT

## 2024-07-12 PROCEDURE — 85025 COMPLETE CBC W/AUTO DIFF WBC: CPT | Mod: 91

## 2024-07-12 PROCEDURE — 83540 ASSAY OF IRON: CPT

## 2024-07-12 PROCEDURE — 85025 COMPLETE CBC W/AUTO DIFF WBC: CPT

## 2024-07-12 PROCEDURE — 84156 ASSAY OF PROTEIN URINE: CPT

## 2024-07-12 PROCEDURE — 82570 ASSAY OF URINE CREATININE: CPT

## 2024-07-12 PROCEDURE — 80069 RENAL FUNCTION PANEL: CPT

## 2024-07-12 PROCEDURE — 82728 ASSAY OF FERRITIN: CPT

## 2024-07-12 PROCEDURE — 81001 URINALYSIS AUTO W/SCOPE: CPT

## 2024-07-12 PROCEDURE — 83550 IRON BINDING TEST: CPT

## 2024-07-12 PROCEDURE — 87086 URINE CULTURE/COLONY COUNT: CPT

## 2024-07-14 LAB
BACTERIA UR CULT: NORMAL
SIGNIFICANT IND 70042: NORMAL
SITE SITE: NORMAL
SOURCE SOURCE: NORMAL

## 2024-07-17 ENCOUNTER — TELEPHONE (OUTPATIENT)
Dept: CARDIOLOGY | Facility: MEDICAL CENTER | Age: 79
End: 2024-07-17
Payer: MEDICARE

## 2024-08-19 ENCOUNTER — OFFICE VISIT (OUTPATIENT)
Dept: URGENT CARE | Facility: CLINIC | Age: 79
End: 2024-08-19
Payer: MEDICARE

## 2024-08-19 VITALS
SYSTOLIC BLOOD PRESSURE: 128 MMHG | HEIGHT: 64 IN | BODY MASS INDEX: 30.05 KG/M2 | WEIGHT: 176 LBS | RESPIRATION RATE: 16 BRPM | OXYGEN SATURATION: 97 % | DIASTOLIC BLOOD PRESSURE: 78 MMHG | TEMPERATURE: 97.3 F | HEART RATE: 75 BPM

## 2024-08-19 DIAGNOSIS — U07.1 COVID-19 VIRUS INFECTION: ICD-10-CM

## 2024-08-19 DIAGNOSIS — N18.32 STAGE 3B CHRONIC KIDNEY DISEASE: ICD-10-CM

## 2024-08-19 DIAGNOSIS — R68.89 FLU-LIKE SYMPTOMS: ICD-10-CM

## 2024-08-19 LAB
FLUAV RNA SPEC QL NAA+PROBE: NEGATIVE
FLUBV RNA SPEC QL NAA+PROBE: NEGATIVE
RSV RNA SPEC QL NAA+PROBE: NEGATIVE
SARS-COV-2 RNA RESP QL NAA+PROBE: POSITIVE

## 2024-08-19 PROCEDURE — 99214 OFFICE O/P EST MOD 30 MIN: CPT | Performed by: NURSE PRACTITIONER

## 2024-08-19 PROCEDURE — 0241U POCT CEPHEID COV-2, FLU A/B, RSV - PCR: CPT | Performed by: NURSE PRACTITIONER

## 2024-08-19 RX ORDER — LOSARTAN POTASSIUM 25 MG/1
25 TABLET ORAL
COMMUNITY
Start: 2024-07-30

## 2024-08-19 RX ORDER — BENZONATATE 100 MG/1
100 CAPSULE ORAL 3 TIMES DAILY PRN
Qty: 60 CAPSULE | Refills: 0 | Status: SHIPPED | OUTPATIENT
Start: 2024-08-19

## 2024-08-19 ASSESSMENT — ENCOUNTER SYMPTOMS
WHEEZING: 0
COUGH: 1
SHORTNESS OF BREATH: 0
SORE THROAT: 1

## 2024-08-19 ASSESSMENT — FIBROSIS 4 INDEX: FIB4 SCORE: 2.8

## 2024-08-19 NOTE — PROGRESS NOTES
Subjective     Berta Cohen is a 78 y.o. female who presents with Cough (X 2 days) and Coronavirus Screening (Did take a Home COVID test at home x 1 day ago it was Negative )            Cough  This is a new problem. Episode onset: pt reports new onset of runny nose, congestion and cough that started 4 days ago. Tmax 99.1F. she did perform a home covid test 2 days ago and it was negative. would like the test repeated. The cough is Non-productive. Associated symptoms include nasal congestion and a sore throat. Pertinent negatives include no shortness of breath or wheezing. Treatments tried: tylenol cold and sinus. The treatment provided mild relief.       Review of Systems   HENT:  Positive for congestion and sore throat.    Respiratory:  Positive for cough. Negative for shortness of breath and wheezing.    All other systems reviewed and are negative.         Past Medical History:   Diagnosis Date    Arthritis     Stage 3b chronic kidney disease 09/11/2017      Past Surgical History:   Procedure Laterality Date    OTHER ORTHOPEDIC SURGERY  2014    R big toe joint replacement    GYN SURGERY      tubal ligation    OTHER ABDOMINAL SURGERY      Burst appendix in 1970s    TONSILLECTOMY        Social History     Socioeconomic History    Marital status:      Spouse name: Not on file    Number of children: Not on file    Years of education: Not on file    Highest education level: Not on file   Occupational History    Not on file   Tobacco Use    Smoking status: Never    Smokeless tobacco: Never   Vaping Use    Vaping status: Never Used   Substance and Sexual Activity    Alcohol use: Yes     Comment: Rarely    Drug use: No    Sexual activity: Not on file   Other Topics Concern    Not on file   Social History Narrative    Not on file     Social Determinants of Health     Financial Resource Strain: Not on file   Food Insecurity: Not on file   Transportation Needs: Not on file   Physical Activity: Not on file  "  Stress: Not on file   Social Connections: Not on file   Intimate Partner Violence: Not on file   Housing Stability: Not on file         Objective     /78 (BP Location: Left arm, Patient Position: Sitting, BP Cuff Size: Large adult)   Pulse 75   Temp 36.3 °C (97.3 °F)   Resp 16   Ht 1.626 m (5' 4\")   Wt 79.8 kg (176 lb)   SpO2 97%   BMI 30.21 kg/m²      Physical Exam  Vitals and nursing note reviewed.   Constitutional:       Appearance: Normal appearance. She is normal weight.   HENT:      Head: Normocephalic and atraumatic.      Right Ear: Tympanic membrane and external ear normal.      Left Ear: Tympanic membrane and external ear normal.      Nose: Congestion present.      Mouth/Throat:      Mouth: Mucous membranes are moist.      Pharynx: Oropharynx is clear.   Eyes:      Extraocular Movements: Extraocular movements intact.      Pupils: Pupils are equal, round, and reactive to light.   Cardiovascular:      Rate and Rhythm: Normal rate and regular rhythm.   Pulmonary:      Effort: Pulmonary effort is normal.      Breath sounds: Normal breath sounds.   Musculoskeletal:         General: Normal range of motion.      Cervical back: Normal range of motion.   Skin:     General: Skin is warm and dry.      Capillary Refill: Capillary refill takes less than 2 seconds.   Neurological:      General: No focal deficit present.      Mental Status: She is alert and oriented to person, place, and time. Mental status is at baseline.   Psychiatric:         Mood and Affect: Mood normal.         Speech: Speech normal.         Thought Content: Thought content normal.         Judgment: Judgment normal.                             Assessment & Plan        Assessment & Plan  Flu-like symptoms    Orders:    POCT CoV-2, Flu A/B, RSV by PCR    COVID-19 virus infection    Orders:    benzonatate (TESSALON) 100 MG Cap; Take 1 Capsule by mouth 3 times a day as needed for Cough.    Nirmatrelvir&Ritonavir 150/100 10 x 150 MG & 10 x " 100MG Tablet Therapy Pack; Take 150 mg nirmatrelvir (one 150 mg tablet) with 100 mg ritonavir (one 100 mg tablet) by mouth, with both tablets taken together twice daily for 5 days.    Stage 3b chronic kidney disease    Orders:    Nirmatrelvir&Ritonavir 150/100 10 x 150 MG & 10 x 100MG Tablet Therapy Pack; Take 150 mg nirmatrelvir (one 150 mg tablet) with 100 mg ritonavir (one 100 mg tablet) by mouth, with both tablets taken together twice daily for 5 days.        Will send in paxlovid, renal dosing for her infection  Please take full course  Patient's vital signs are reassuring and they appear hemodynamically stable and do not require higher level care at this time  I discussed self isolation and provided printed instructions (if applicable)  I discussed ER precautions and provided printed instructions (if applicable)  If requested, I provided the patient with a work note to provide to their employer or school regarding returning to work and discontinuation of self isolation.  All questions were answered and patient demonstrated verbal understanding of above.  I followed all reasonable PPE precautions during this encounter including but not limited to use of an N95 mask, gloves, and gown if indicated.      Supportive care, differential diagnoses, and indications for immediate follow-up discussed with patient.    Pathogenesis of diagnosis discussed including typical length and natural progression.    Instructed to return to  or nearest emergency department if symptoms fail to improve, for any change in condition, further concerns, or new concerning symptoms.  Patient states understanding of the plan of care and discharge instructions.

## 2024-08-19 NOTE — ASSESSMENT & PLAN NOTE
Orders:    Nirmatrelvir&Ritonavir 150/100 10 x 150 MG & 10 x 100MG Tablet Therapy Pack; Take 150 mg nirmatrelvir (one 150 mg tablet) with 100 mg ritonavir (one 100 mg tablet) by mouth, with both tablets taken together twice daily for 5 days.

## 2024-11-06 ENCOUNTER — HOSPITAL ENCOUNTER (OUTPATIENT)
Dept: LAB | Facility: MEDICAL CENTER | Age: 79
End: 2024-11-06
Payer: MEDICARE

## 2024-11-06 LAB
ALBUMIN SERPL BCP-MCNC: 4.3 G/DL (ref 3.2–4.9)
ALP SERPL-CCNC: 48 U/L (ref 30–99)
ALT SERPL-CCNC: 11 U/L (ref 2–50)
AST SERPL-CCNC: 19 U/L (ref 12–45)
BASOPHILS # BLD AUTO: 0.5 % (ref 0–1.8)
BASOPHILS # BLD: 0.03 K/UL (ref 0–0.12)
BILIRUB CONJ SERPL-MCNC: <0.2 MG/DL (ref 0.1–0.5)
BILIRUB INDIRECT SERPL-MCNC: NORMAL MG/DL (ref 0–1)
BILIRUB SERPL-MCNC: 0.5 MG/DL (ref 0.1–1.5)
EOSINOPHIL # BLD AUTO: 0.34 K/UL (ref 0–0.51)
EOSINOPHIL NFR BLD: 5.8 % (ref 0–6.9)
ERYTHROCYTE [DISTWIDTH] IN BLOOD BY AUTOMATED COUNT: 48.2 FL (ref 35.9–50)
FERRITIN SERPL-MCNC: 38.4 NG/ML (ref 10–291)
HCT VFR BLD AUTO: 38.1 % (ref 37–47)
HGB BLD-MCNC: 12.5 G/DL (ref 12–16)
IMM GRANULOCYTES # BLD AUTO: 0.02 K/UL (ref 0–0.11)
IMM GRANULOCYTES NFR BLD AUTO: 0.3 % (ref 0–0.9)
IRON SATN MFR SERPL: 29 % (ref 15–55)
IRON SERPL-MCNC: 73 UG/DL (ref 40–170)
LYMPHOCYTES # BLD AUTO: 1.17 K/UL (ref 1–4.8)
LYMPHOCYTES NFR BLD: 19.9 % (ref 22–41)
MCH RBC QN AUTO: 32.2 PG (ref 27–33)
MCHC RBC AUTO-ENTMCNC: 32.8 G/DL (ref 32.2–35.5)
MCV RBC AUTO: 98.2 FL (ref 81.4–97.8)
MONOCYTES # BLD AUTO: 0.52 K/UL (ref 0–0.85)
MONOCYTES NFR BLD AUTO: 8.8 % (ref 0–13.4)
NEUTROPHILS # BLD AUTO: 3.8 K/UL (ref 1.82–7.42)
NEUTROPHILS NFR BLD: 64.7 % (ref 44–72)
NRBC # BLD AUTO: 0 K/UL
NRBC BLD-RTO: 0 /100 WBC (ref 0–0.2)
PLATELET # BLD AUTO: 186 K/UL (ref 164–446)
PMV BLD AUTO: 11.5 FL (ref 9–12.9)
PROT SERPL-MCNC: 6.8 G/DL (ref 6–8.2)
RBC # BLD AUTO: 3.88 M/UL (ref 4.2–5.4)
TIBC SERPL-MCNC: 250 UG/DL (ref 250–450)
UIBC SERPL-MCNC: 177 UG/DL (ref 110–370)
WBC # BLD AUTO: 5.9 K/UL (ref 4.8–10.8)

## 2024-11-06 PROCEDURE — 83550 IRON BINDING TEST: CPT

## 2024-11-06 PROCEDURE — 80076 HEPATIC FUNCTION PANEL: CPT

## 2024-11-06 PROCEDURE — 82728 ASSAY OF FERRITIN: CPT

## 2024-11-06 PROCEDURE — 85025 COMPLETE CBC W/AUTO DIFF WBC: CPT

## 2024-11-06 PROCEDURE — 83540 ASSAY OF IRON: CPT

## 2024-11-06 PROCEDURE — 36415 COLL VENOUS BLD VENIPUNCTURE: CPT

## 2024-11-12 ENCOUNTER — HOSPITAL ENCOUNTER (OUTPATIENT)
Dept: LAB | Facility: MEDICAL CENTER | Age: 79
End: 2024-11-12
Attending: FAMILY MEDICINE
Payer: MEDICARE

## 2024-11-12 LAB
APPEARANCE UR: CLEAR
BACTERIA #/AREA URNS HPF: ABNORMAL /HPF
BILIRUB UR QL STRIP.AUTO: NEGATIVE
CASTS URNS QL MICRO: ABNORMAL /LPF (ref 0–2)
COLOR UR: YELLOW
EPITHELIAL CELLS 1715: ABNORMAL /HPF (ref 0–5)
ERYTHROCYTE [SEDIMENTATION RATE] IN BLOOD BY WESTERGREN METHOD: 29 MM/HOUR (ref 0–25)
GLUCOSE UR STRIP.AUTO-MCNC: NEGATIVE MG/DL
KETONES UR STRIP.AUTO-MCNC: NEGATIVE MG/DL
LEUKOCYTE ESTERASE UR QL STRIP.AUTO: ABNORMAL
MICRO URNS: ABNORMAL
NITRITE UR QL STRIP.AUTO: NEGATIVE
PH UR STRIP.AUTO: 6.5 [PH] (ref 5–8)
PROT UR QL STRIP: 100 MG/DL
RBC # URNS HPF: ABNORMAL /HPF (ref 0–2)
RBC UR QL AUTO: NEGATIVE
SP GR UR STRIP.AUTO: 1.02
UROBILINOGEN UR STRIP.AUTO-MCNC: 0.2 EU/DL
WBC #/AREA URNS HPF: ABNORMAL /HPF

## 2024-11-12 PROCEDURE — 87086 URINE CULTURE/COLONY COUNT: CPT

## 2024-11-12 PROCEDURE — 84443 ASSAY THYROID STIM HORMONE: CPT

## 2024-11-12 PROCEDURE — 83880 ASSAY OF NATRIURETIC PEPTIDE: CPT

## 2024-11-12 PROCEDURE — 80053 COMPREHEN METABOLIC PANEL: CPT

## 2024-11-12 PROCEDURE — 86140 C-REACTIVE PROTEIN: CPT

## 2024-11-12 PROCEDURE — 81001 URINALYSIS AUTO W/SCOPE: CPT

## 2024-11-12 PROCEDURE — 82550 ASSAY OF CK (CPK): CPT

## 2024-11-12 PROCEDURE — 87186 SC STD MICRODIL/AGAR DIL: CPT

## 2024-11-12 PROCEDURE — 85652 RBC SED RATE AUTOMATED: CPT

## 2024-11-12 PROCEDURE — 87077 CULTURE AEROBIC IDENTIFY: CPT

## 2024-11-12 PROCEDURE — 36415 COLL VENOUS BLD VENIPUNCTURE: CPT

## 2024-11-13 LAB
ALBUMIN SERPL BCP-MCNC: 3.9 G/DL (ref 3.2–4.9)
ALBUMIN/GLOB SERPL: 2 G/DL
ALP SERPL-CCNC: 42 U/L (ref 30–99)
ALT SERPL-CCNC: 9 U/L (ref 2–50)
ANION GAP SERPL CALC-SCNC: 11 MMOL/L (ref 7–16)
AST SERPL-CCNC: 18 U/L (ref 12–45)
BILIRUB SERPL-MCNC: 0.3 MG/DL (ref 0.1–1.5)
BUN SERPL-MCNC: 31 MG/DL (ref 8–22)
CALCIUM ALBUM COR SERPL-MCNC: 9.1 MG/DL (ref 8.5–10.5)
CALCIUM SERPL-MCNC: 9 MG/DL (ref 8.5–10.5)
CHLORIDE SERPL-SCNC: 110 MMOL/L (ref 96–112)
CK SERPL-CCNC: 44 U/L (ref 0–154)
CO2 SERPL-SCNC: 17 MMOL/L (ref 20–33)
CREAT SERPL-MCNC: 1.9 MG/DL (ref 0.5–1.4)
CRP SERPL HS-MCNC: <0.3 MG/DL (ref 0–0.75)
GFR SERPLBLD CREATININE-BSD FMLA CKD-EPI: 26 ML/MIN/1.73 M 2
GLOBULIN SER CALC-MCNC: 2 G/DL (ref 1.9–3.5)
GLUCOSE SERPL-MCNC: 108 MG/DL (ref 65–99)
NT-PROBNP SERPL IA-MCNC: 734 PG/ML (ref 0–125)
POTASSIUM SERPL-SCNC: 5 MMOL/L (ref 3.6–5.5)
PROT SERPL-MCNC: 5.9 G/DL (ref 6–8.2)
SODIUM SERPL-SCNC: 138 MMOL/L (ref 135–145)
TSH SERPL-ACNC: 2.82 UIU/ML (ref 0.35–5.5)

## 2024-11-14 LAB
BACTERIA UR CULT: ABNORMAL
BACTERIA UR CULT: ABNORMAL
SIGNIFICANT IND 70042: ABNORMAL
SITE SITE: ABNORMAL
SOURCE SOURCE: ABNORMAL

## 2024-12-03 ENCOUNTER — OFFICE VISIT (OUTPATIENT)
Dept: URGENT CARE | Facility: CLINIC | Age: 79
End: 2024-12-03
Payer: MEDICARE

## 2024-12-03 VITALS
TEMPERATURE: 97.9 F | OXYGEN SATURATION: 99 % | RESPIRATION RATE: 16 BRPM | HEIGHT: 64 IN | SYSTOLIC BLOOD PRESSURE: 130 MMHG | DIASTOLIC BLOOD PRESSURE: 78 MMHG | BODY MASS INDEX: 29.98 KG/M2 | HEART RATE: 79 BPM | WEIGHT: 175.6 LBS

## 2024-12-03 DIAGNOSIS — J01.00 ACUTE NON-RECURRENT MAXILLARY SINUSITIS: ICD-10-CM

## 2024-12-03 PROCEDURE — 3075F SYST BP GE 130 - 139MM HG: CPT | Performed by: STUDENT IN AN ORGANIZED HEALTH CARE EDUCATION/TRAINING PROGRAM

## 2024-12-03 PROCEDURE — 99213 OFFICE O/P EST LOW 20 MIN: CPT | Performed by: STUDENT IN AN ORGANIZED HEALTH CARE EDUCATION/TRAINING PROGRAM

## 2024-12-03 PROCEDURE — 3078F DIAST BP <80 MM HG: CPT | Performed by: STUDENT IN AN ORGANIZED HEALTH CARE EDUCATION/TRAINING PROGRAM

## 2024-12-03 ASSESSMENT — ENCOUNTER SYMPTOMS
EYES NEGATIVE: 1
CARDIOVASCULAR NEGATIVE: 1
SINUS PAIN: 1
CONSTITUTIONAL NEGATIVE: 1
COUGH: 1

## 2024-12-03 ASSESSMENT — FIBROSIS 4 INDEX: FIB4 SCORE: 2.612903225806451613

## 2024-12-03 NOTE — PROGRESS NOTES
Subjective:   Berta Cohen is a 79 y.o. female who presents for Congestion (Sinus congestion, cough x1.5weeks)      HPI:    79-year-old female who presents with 2-week history of worsening sinus congestion and sinus pain.  She reports pain is primarily behind her eyes and she feels extremely full with lots of runny nose and postnasal drip.  She is reporting worsening cough specifically at night when she is lying down and difficulty sleeping.  She reports her cough is productive of greenish sputum.  She denies any fevers or chills.    She reports significant nasal discharge often clear but sometimes thick and green.  She reports a history of multiple sinus issues in the past.    Review of Systems   Constitutional: Negative.    HENT:  Positive for congestion and sinus pain.    Eyes: Negative.    Respiratory:  Positive for cough.    Cardiovascular: Negative.        Medications:    CALCIUM PO  carvedilol Tabs  estradiol  Flax Oil  losartan Tabs  medroxyPROGESTERone Tabs  TYLENOL PO    Allergies: Hmg-coa-r inhibitors, Neomycin-bacitracin-polymyxin, Atorvastatin, Ipratropium, Neosporin [neomycin-polymyxin b], and Rosuvastatin calcium    Problem List: Berta Cohen does not have any pertinent problems on file.    Surgical History:  Past Surgical History:   Procedure Laterality Date    OTHER ORTHOPEDIC SURGERY  2014    R big toe joint replacement    GYN SURGERY      tubal ligation    OTHER ABDOMINAL SURGERY      Burst appendix in 1970s    TONSILLECTOMY         Past Social Hx: Berta Cohen  reports that she has never smoked. She has never used smokeless tobacco. She reports current alcohol use. She reports that she does not use drugs.     Past Family Hx:  Berta Cohen family history includes Arrythmia in her brother; Heart Disease in her mother; Kidney Disease in her mother; Other in her father.     Problem list, medications, and allergies reviewed by myself today in Epic.     Objective:  "    /78 (BP Location: Left arm, Patient Position: Sitting, BP Cuff Size: Adult long)   Pulse 79   Temp 36.6 °C (97.9 °F) (Temporal)   Resp 16   Ht 1.626 m (5' 4\")   Wt 79.7 kg (175 lb 9.6 oz)   SpO2 99%   BMI 30.14 kg/m²     Physical Exam  Constitutional:       Appearance: Normal appearance.   HENT:      Head: Normocephalic and atraumatic.      Right Ear: Tympanic membrane normal.      Left Ear: Tympanic membrane normal.      Nose:      Right Turbinates: Swollen and pale.      Left Turbinates: Swollen and pale.      Right Sinus: Maxillary sinus tenderness present.      Left Sinus: Maxillary sinus tenderness present.      Mouth/Throat:      Mouth: Mucous membranes are moist.      Pharynx: Oropharynx is clear.   Eyes:      Extraocular Movements: Extraocular movements intact.      Conjunctiva/sclera: Conjunctivae normal.   Neurological:      Mental Status: She is alert.         Assessment/Plan:     Diagnosis and associated orders:     1. Acute non-recurrent maxillary sinusitis  amoxicillin-clavulanate (AUGMENTIN) 875-125 MG Tab         Comments/MDM:     1. Acute non-recurrent maxillary sinusitis  Patient presents with 2-week history of worsening sinus congestion get acutely worse with past few days.  On physical examination bilateral turbinates pale and swollen, excessive rhinorrhea present on examination.  From toe and maxillary sinus tenderness.  - Given patient's history of sinus issues will treat with Augmentin x 5 days as below.  - We discussed use of Mucinex and Flonase to help relieve some symptoms as well.  - Return precaution discussed including worsening signs of sinus infection or pain, redevelopment of fever or worsening cough/shortness of breath.  - amoxicillin-clavulanate (AUGMENTIN) 875-125 MG Tab; Take 1 Tablet by mouth 2 times a day for 5 days.  Dispense: 10 Tablet; Refill: 0           Differential diagnosis, natural history, supportive care, and indications for immediate follow-up " discussed.    Advised the patient to follow-up with the primary care physician for recheck, reevaluation, and consideration of further management.    Please note that this dictation was created using voice recognition software. I have made a reasonable attempt to correct obvious errors, but I expect that there are errors of grammar and possibly content that I did not discover before finalizing the note.    Nakul Govea M.D.

## 2024-12-10 ENCOUNTER — OFFICE VISIT (OUTPATIENT)
Dept: URGENT CARE | Facility: CLINIC | Age: 79
End: 2024-12-10
Payer: MEDICARE

## 2024-12-10 VITALS
HEART RATE: 81 BPM | TEMPERATURE: 97.3 F | SYSTOLIC BLOOD PRESSURE: 136 MMHG | WEIGHT: 176 LBS | BODY MASS INDEX: 30.05 KG/M2 | HEIGHT: 64 IN | RESPIRATION RATE: 18 BRPM | OXYGEN SATURATION: 98 % | DIASTOLIC BLOOD PRESSURE: 76 MMHG

## 2024-12-10 DIAGNOSIS — J01.90 ACUTE BACTERIAL SINUSITIS: ICD-10-CM

## 2024-12-10 DIAGNOSIS — B96.89 ACUTE BACTERIAL SINUSITIS: ICD-10-CM

## 2024-12-10 PROCEDURE — 99213 OFFICE O/P EST LOW 20 MIN: CPT | Performed by: STUDENT IN AN ORGANIZED HEALTH CARE EDUCATION/TRAINING PROGRAM

## 2024-12-10 PROCEDURE — 3078F DIAST BP <80 MM HG: CPT | Performed by: STUDENT IN AN ORGANIZED HEALTH CARE EDUCATION/TRAINING PROGRAM

## 2024-12-10 PROCEDURE — 3075F SYST BP GE 130 - 139MM HG: CPT | Performed by: STUDENT IN AN ORGANIZED HEALTH CARE EDUCATION/TRAINING PROGRAM

## 2024-12-10 ASSESSMENT — FIBROSIS 4 INDEX: FIB4 SCORE: 2.612903225806451613

## 2024-12-10 NOTE — PROGRESS NOTES
Subjective:   Berta Cohen is a 79 y.o. female who presents for Medication Refill (Patient explains was taking amoxicillin x 5 days, did finish the course for a sinus infection, would like a refill, last dose takes 12/8/24)      HPI:  79-year-old female presents to the urgent care for continued bacterial sinusitis.  Was seen recently urgent care and started on course of Augmentin.  Took 5-day course this medication and reports good improvement in her symptoms.  Just manage the last day of medication but still has some symptoms remaining and feels like her congestion is maybe slightly worsening now that she is off the medication.  States in the past she has typically needed 7 to 10 days on Augmentin to resolve sinus infections.  Would like to trial additional 5-day course.  Not experiencing any side effects of medication.  No fever, chills, nausea vomiting, shortness of breath, chest pain.      Medications:    amoxicillin-clavulanate Tabs  CALCIUM PO  carvedilol Tabs  estradiol  Flax Oil  losartan Tabs  medroxyPROGESTERone Tabs  TYLENOL PO    Allergies: Hmg-coa-r inhibitors, Neomycin-bacitracin-polymyxin, Atorvastatin, Ipratropium, Neosporin [neomycin-polymyxin b], and Rosuvastatin calcium    Problem List: Berta Cohen does not have any pertinent problems on file.    Surgical History:  Past Surgical History:   Procedure Laterality Date    OTHER ORTHOPEDIC SURGERY  2014    R big toe joint replacement    GYN SURGERY      tubal ligation    OTHER ABDOMINAL SURGERY      Burst appendix in 1970s    TONSILLECTOMY         Past Social Hx: Berta Cohen  reports that she has never smoked. She has never used smokeless tobacco. She reports current alcohol use. She reports that she does not use drugs.     Past Family Hx:  Berta Cohen family history includes Arrythmia in her brother; Heart Disease in her mother; Kidney Disease in her mother; Other in her father.     Problem list, medications, and  "allergies reviewed by myself today in Epic.     Objective:     /76 (BP Location: Left arm, Patient Position: Sitting, BP Cuff Size: Large adult)   Pulse 81   Temp 36.3 °C (97.3 °F)   Resp 18   Ht 1.626 m (5' 4\")   Wt 79.8 kg (176 lb)   SpO2 98%   BMI 30.21 kg/m²     Physical Exam  Vitals reviewed.   HENT:      Nose: Congestion and rhinorrhea present.   Cardiovascular:      Rate and Rhythm: Normal rate.      Pulses: Normal pulses.   Pulmonary:      Effort: Pulmonary effort is normal. No respiratory distress.      Breath sounds: No wheezing, rhonchi or rales.         Assessment/Plan:     Diagnosis and associated orders:     1. Acute bacterial sinusitis  amoxicillin-clavulanate (AUGMENTIN) 875-125 MG Tab         Comments/MDM:     Patient's presentation physical exam findings are consistent with acute bacterial sinusitis.  Has had good improvement with Augmentin 5-day course.  Discussed recurrent sinusitis versus response to Augmentin that may require additional time.  Patient would like to trial additional 5-day course of Augmentin rather than switching to doxycycline.  I do believe this is reasonable at this point.  Patient is agreeable to the plan.  Continue all supportive care.  Return precautions given.         Differential diagnosis, natural history, supportive care, and indications for immediate follow-up discussed.    Advised the patient to follow-up with the primary care physician for recheck, reevaluation, and consideration of further management.    Please note that this dictation was created using voice recognition software. I have made a reasonable attempt to correct obvious errors, but I expect that there are errors of grammar and possibly content that I did not discover before finalizing the note.    Electronically signed by Juan José Sanders PA-C.      "

## 2024-12-19 ENCOUNTER — HOSPITAL ENCOUNTER (OUTPATIENT)
Dept: LAB | Facility: MEDICAL CENTER | Age: 79
End: 2024-12-19
Attending: INTERNAL MEDICINE
Payer: MEDICARE

## 2024-12-19 ENCOUNTER — HOSPITAL ENCOUNTER (OUTPATIENT)
Dept: LAB | Facility: MEDICAL CENTER | Age: 79
End: 2024-12-19
Attending: FAMILY MEDICINE
Payer: MEDICARE

## 2024-12-19 LAB
ALBUMIN SERPL BCP-MCNC: 4.2 G/DL (ref 3.2–4.9)
ALBUMIN/GLOB SERPL: 1.9 G/DL
ALP SERPL-CCNC: 46 U/L (ref 30–99)
ALT SERPL-CCNC: 10 U/L (ref 2–50)
ANION GAP SERPL CALC-SCNC: 11 MMOL/L (ref 7–16)
ANION GAP SERPL CALC-SCNC: 9 MMOL/L (ref 7–16)
AST SERPL-CCNC: 15 U/L (ref 12–45)
BILIRUB SERPL-MCNC: 0.4 MG/DL (ref 0.1–1.5)
BUN SERPL-MCNC: 27 MG/DL (ref 8–22)
BUN SERPL-MCNC: 27 MG/DL (ref 8–22)
CALCIUM ALBUM COR SERPL-MCNC: 9 MG/DL (ref 8.5–10.5)
CALCIUM SERPL-MCNC: 9.2 MG/DL (ref 8.5–10.5)
CALCIUM SERPL-MCNC: 9.2 MG/DL (ref 8.5–10.5)
CHLORIDE SERPL-SCNC: 107 MMOL/L (ref 96–112)
CHLORIDE SERPL-SCNC: 107 MMOL/L (ref 96–112)
CO2 SERPL-SCNC: 20 MMOL/L (ref 20–33)
CO2 SERPL-SCNC: 21 MMOL/L (ref 20–33)
CREAT SERPL-MCNC: 1.49 MG/DL (ref 0.5–1.4)
CREAT SERPL-MCNC: 1.5 MG/DL (ref 0.5–1.4)
GFR SERPLBLD CREATININE-BSD FMLA CKD-EPI: 35 ML/MIN/1.73 M 2
GFR SERPLBLD CREATININE-BSD FMLA CKD-EPI: 35 ML/MIN/1.73 M 2
GLOBULIN SER CALC-MCNC: 2.2 G/DL (ref 1.9–3.5)
GLUCOSE SERPL-MCNC: 93 MG/DL (ref 65–99)
GLUCOSE SERPL-MCNC: 94 MG/DL (ref 65–99)
NT-PROBNP SERPL IA-MCNC: 460 PG/ML (ref 0–125)
POTASSIUM SERPL-SCNC: 4.5 MMOL/L (ref 3.6–5.5)
POTASSIUM SERPL-SCNC: 4.6 MMOL/L (ref 3.6–5.5)
PROT SERPL-MCNC: 6.4 G/DL (ref 6–8.2)
SODIUM SERPL-SCNC: 137 MMOL/L (ref 135–145)
SODIUM SERPL-SCNC: 138 MMOL/L (ref 135–145)

## 2024-12-19 PROCEDURE — 36415 COLL VENOUS BLD VENIPUNCTURE: CPT

## 2024-12-19 PROCEDURE — 84156 ASSAY OF PROTEIN URINE: CPT

## 2024-12-19 PROCEDURE — 83880 ASSAY OF NATRIURETIC PEPTIDE: CPT

## 2024-12-19 PROCEDURE — 80048 BASIC METABOLIC PNL TOTAL CA: CPT

## 2024-12-19 PROCEDURE — 82570 ASSAY OF URINE CREATININE: CPT

## 2024-12-19 PROCEDURE — 80053 COMPREHEN METABOLIC PANEL: CPT

## 2024-12-20 LAB
CREAT UR-MCNC: 74.39 MG/DL
PROT UR-MCNC: 83 MG/DL (ref 0–15)
PROT/CREAT UR: 1116 MG/G (ref 10–107)

## 2025-01-09 ENCOUNTER — TELEPHONE (OUTPATIENT)
Dept: HEALTH INFORMATION MANAGEMENT | Facility: OTHER | Age: 80
End: 2025-01-09
Payer: MEDICARE

## 2025-01-14 NOTE — ASSESSMENT & PLAN NOTE
Chronic, ongoing. Pt manages with routine phlebotomy. Managed by Dr Foster Follow up with GI per routine.   Latest Reference Range & Units 11/06/24 07:45   Iron 40 - 170 ug/dL 73   Total Iron Binding 250 - 450 ug/dL 250   % Saturation 15 - 55 % 29   Unsat Iron Binding 110 - 370 ug/dL 177

## 2025-01-14 NOTE — ASSESSMENT & PLAN NOTE
Chronic, stable. Associated with evidence of coronary artery calcifications. Denies CP, dyspnea. Most recent lipid panel from 9/2017 with LDL at 105. Pt reports myalgias with statins Recommend Mediterranean diet and regular physical activity. Follow up with PCP at least annually for continued monitoring and management.   Lab Results   Component Value Date/Time    CHOLSTRLTOT 175 09/12/2017 04:58 AM     (H) 09/12/2017 04:58 AM    HDL 38 (A) 09/12/2017 04:58 AM    TRIGLYCERIDE 162 (H) 09/12/2017 04:58 AM

## 2025-01-14 NOTE — ASSESSMENT & PLAN NOTE
Chronic, stable. BP today 122/68. Pt monitors BP at home reporting she generally runs slightly higher at home. Denies dizziness/lightheadedness, chest pain, dyspnea. Continue current treatment regime: carvedilol 6.25mg BID, losartan 100mg daily. Follow up with PCP annually for continued monitoring and management.

## 2025-01-15 ENCOUNTER — OFFICE VISIT (OUTPATIENT)
Dept: FAMILY PLANNING/WOMEN'S HEALTH CLINIC | Facility: PHYSICIAN GROUP | Age: 80
End: 2025-01-15
Payer: MEDICARE

## 2025-01-15 VITALS
RESPIRATION RATE: 14 BRPM | BODY MASS INDEX: 29.53 KG/M2 | DIASTOLIC BLOOD PRESSURE: 68 MMHG | OXYGEN SATURATION: 96 % | HEIGHT: 64 IN | HEART RATE: 76 BPM | SYSTOLIC BLOOD PRESSURE: 122 MMHG | WEIGHT: 173 LBS

## 2025-01-15 DIAGNOSIS — E83.110 HEREDITARY HEMOCHROMATOSIS (HCC): ICD-10-CM

## 2025-01-15 DIAGNOSIS — I25.10 CORONARY ARTERY CALCIFICATION SEEN ON CAT SCAN: ICD-10-CM

## 2025-01-15 DIAGNOSIS — Z91.81 HISTORY OF FALL: ICD-10-CM

## 2025-01-15 DIAGNOSIS — N18.32 STAGE 3B CHRONIC KIDNEY DISEASE: ICD-10-CM

## 2025-01-15 DIAGNOSIS — I10 PRIMARY HYPERTENSION: ICD-10-CM

## 2025-01-15 DIAGNOSIS — E78.5 DYSLIPIDEMIA: ICD-10-CM

## 2025-01-15 DIAGNOSIS — Z78.0 POSTMENOPAUSAL: ICD-10-CM

## 2025-01-15 DIAGNOSIS — Z78.9 STATIN INTOLERANCE: ICD-10-CM

## 2025-01-15 PROCEDURE — 1126F AMNT PAIN NOTED NONE PRSNT: CPT | Performed by: PHYSICIAN ASSISTANT

## 2025-01-15 PROCEDURE — 3078F DIAST BP <80 MM HG: CPT | Performed by: PHYSICIAN ASSISTANT

## 2025-01-15 PROCEDURE — G0438 PPPS, INITIAL VISIT: HCPCS | Performed by: PHYSICIAN ASSISTANT

## 2025-01-15 PROCEDURE — 3074F SYST BP LT 130 MM HG: CPT | Performed by: PHYSICIAN ASSISTANT

## 2025-01-15 RX ORDER — FUROSEMIDE 20 MG/1
20 TABLET ORAL
COMMUNITY

## 2025-01-15 RX ORDER — VALSARTAN 320 MG/1
320 TABLET ORAL DAILY
COMMUNITY
Start: 2025-01-06

## 2025-01-15 RX ORDER — AMLODIPINE BESYLATE 5 MG/1
5 TABLET ORAL 2 TIMES DAILY
COMMUNITY

## 2025-01-15 SDOH — ECONOMIC STABILITY: FOOD INSECURITY: WITHIN THE PAST 12 MONTHS, THE FOOD YOU BOUGHT JUST DIDN'T LAST AND YOU DIDN'T HAVE MONEY TO GET MORE.: NEVER TRUE

## 2025-01-15 SDOH — ECONOMIC STABILITY: TRANSPORTATION INSECURITY: IN THE PAST 12 MONTHS, HAS LACK OF TRANSPORTATION KEPT YOU FROM MEDICAL APPOINTMENTS OR FROM GETTING MEDICATIONS?: NO

## 2025-01-15 SDOH — ECONOMIC STABILITY: FOOD INSECURITY: WITHIN THE PAST 12 MONTHS, YOU WORRIED THAT YOUR FOOD WOULD RUN OUT BEFORE YOU GOT THE MONEY TO BUY MORE.: NEVER TRUE

## 2025-01-15 SDOH — ECONOMIC STABILITY: FOOD INSECURITY: HOW HARD IS IT FOR YOU TO PAY FOR THE VERY BASICS LIKE FOOD, HOUSING, MEDICAL CARE, AND HEATING?: NOT HARD AT ALL

## 2025-01-15 ASSESSMENT — PATIENT HEALTH QUESTIONNAIRE - PHQ9
CLINICAL INTERPRETATION OF PHQ2 SCORE: 2
SUM OF ALL RESPONSES TO PHQ QUESTIONS 1-9: 4
5. POOR APPETITE OR OVEREATING: 0 - NOT AT ALL

## 2025-01-15 ASSESSMENT — ACTIVITIES OF DAILY LIVING (ADL)
LACK_OF_TRANSPORTATION: NO
BATHING_REQUIRES_ASSISTANCE: 0

## 2025-01-15 ASSESSMENT — ENCOUNTER SYMPTOMS: GENERAL WELL-BEING: GOOD

## 2025-01-15 ASSESSMENT — FIBROSIS 4 INDEX: FIB4 SCORE: 2.07

## 2025-01-15 ASSESSMENT — PAIN SCALES - GENERAL: PAINLEVEL_OUTOF10: NO PAIN

## 2025-01-15 NOTE — PROGRESS NOTES
Comprehensive Health Assessment Program     Berta Cohen is a 79 y.o. here for her comprehensive health assessment.    Patient Active Problem List    Diagnosis Date Noted    Statin intolerance 01/15/2025    History of fall 01/15/2025    Hereditary hemochromatosis (HCC) 05/07/2024    Coronary artery calcification seen on CAT scan 05/07/2024    Lung nodule < 6cm on CT 09/18/2017    Thyroid nodule 09/18/2017    Dyslipidemia 09/18/2017    Emesis 09/11/2017    Insomnia 09/11/2017    Left-sided thoracic back pain 09/11/2017    Stage 3b chronic kidney disease     Primary hypertension     Vertigo 07/15/2014    Intractable nausea and vomiting 07/15/2014    Lower urinary tract infectious disease 06/17/2013       Current Outpatient Medications   Medication Sig Dispense Refill    furosemide (LASIX) 20 MG Tab Take 20 mg by mouth every day.      valsartan (DIOVAN) 320 MG tablet Take 320 mg by mouth every day.      amLODIPine (NORVASC) 5 MG Tab Take 5 mg by mouth 2 times a day.      Acetaminophen (TYLENOL PO) Take  by mouth.      Flax Oil Take 1 Capsule by mouth every day.      estradiol (ESTRACE) 0.5 MG tablet Take 0.5 mg by mouth every day.      medroxyPROGESTERone (PROVERA) 2.5 MG Tab Take 2.5 mg by mouth every day.       No current facility-administered medications for this visit.          Current supplements as per medication list.     Allergies:   Hmg-coa-r inhibitors, Neomycin-bacitracin-polymyxin, Atorvastatin, Ipratropium, Neosporin [neomycin-polymyxin b], and Rosuvastatin calcium  Social History     Tobacco Use    Smoking status: Never    Smokeless tobacco: Never   Vaping Use    Vaping status: Never Used   Substance Use Topics    Alcohol use: Not Currently     Comment: Rarely    Drug use: No     Family History   Problem Relation Age of Onset    Heart Disease Mother     Kidney Disease Mother     Other Father         aortic aneurysm rupture age 73.    Arrythmia Brother      Berta  has a past medical history  of Arthritis and Stage 3b chronic kidney disease (09/11/2017).    She has no past medical history of Cancer (HCC).   Past Surgical History:   Procedure Laterality Date    OTHER ORTHOPEDIC SURGERY  2014    R big toe joint replacement    GYN SURGERY      tubal ligation    OTHER ABDOMINAL SURGERY      Burst appendix in 1970s    TONSILLECTOMY         Screening:  In the last six months have you experienced any leakage of urine? No    Depression Screening  Little interest or pleasure in doing things?  1 - several days  Feeling down, depressed , or hopeless? 1 - several days  Trouble falling or staying asleep, or sleeping too much?  1 - several days  Feeling tired or having little energy?  1 - several days  Poor appetite or overeating?  0 - not at all  Feeling bad about yourself - or that you are a failure or have let yourself or your family down? 0 - not at all  Trouble concentrating on things, such as reading the newspaper or watching television? 0 - not at all  Moving or speaking so slowly that other people could have noticed.  Or the opposite - being so fidgety or restless that you have been moving around a lot more than usual?  0 - not at all  Thoughts that you would be better off dead, or of hurting yourself?  0 - not at all  Patient Health Questionnaire Score: 4, pt attributes to s/e of carvedilol    If depressive symptoms identified deferred to follow up visit unless specifically addressed in assessment and plan.    Interpretation of PHQ-9 Total Score   Score Severity   1-4 No Depression   5-9 Mild Depression   10-14 Moderate Depression   15-19 Moderately Severe Depression   20-27 Severe Depression    Screening for Cognitive Impairment  Do you or any of your friends or family members have any concern about your memory? No  Three Minute Recall (Leader, Season, Table) 3/3    Billy clock face with all 12 numbers and set the hands to show 10 minutes after 11.  Yes 5  Cognitive concerns identified deferred for follow up  unless specifically addressed in assessment and plan.    Fall Risk Assessment  Has the patient had two or more falls in the last year or any fall with injury in the last year?  Yes, pt was feeding fish at a pond and maneuvering on uneven terrain with wind thi resulting in muscle/tendon strains, followed by 6 weeks of PT.    Safety Assessment  Do you always wear your seatbelt?  Yes  Any changes to home needed to function safely? No  Difficulty hearing.  No  Patient counseled about all safety risks that were identified.    Functional Assessment ADLs  Are there any barriers preventing you from cooking for yourself or meeting nutritional needs?  No.    Are there any barriers preventing you from driving safely or obtaining transportation?  No.    Are there any barriers preventing you from using a telephone or calling for help?  No    Are there any barriers preventing you from shopping?  No.    Are there any barriers preventing you from taking care of your own finances?  No    Are there any barriers preventing you from managing your medications?  No    Are there any barriers preventing you from showering, bathing or dressing yourself? No    Are there any barriers preventing you from doing housework or laundry? No    Are there any barriers preventing you from using the toilet?No    Are you currently engaging in any exercise or physical activity?  No.      Self-Assessment of Health  What is your perception of your health? Good    Do you sleep more than six hours a night? Yes    In the past 7 days, how much did pain keep you from doing your normal work? Some    Do you spend quality time with family or friends (virtually or in person)? Yes    Do you usually eat a heart healthy diet that constists of a variety of fruits, vegetables, whole grains and fiber? Yes    Do you eat foods high in fat and/or Fast Food more than three times per week? No    How concerned are you that your medical conditions are not being well managed? a  little    Are you worried that in the next 2 months, you may not have stable housing that you own, rent, or stay in as part of a household? No        Advance Care Planning  Do you have an Advance Directive, Living Will, Durable Power of , or POLST? Yes  Advance Directive Living Will Durable Power of    is not on file - instructed patient to bring in a copy to scan into their chart      Health Maintenance Summary            Ordered - Bone Density Scan (Every 5 Years) Ordered on 1/15/2025      No completion history exists for this topic.              Overdue - IMM DTaP/Tdap/Td Vaccine (1 - Tdap) Never done      No completion history exists for this topic.              Overdue - Zoster (Shingles) Vaccines (1 of 2) Never done      No completion history exists for this topic.              Annual Wellness Visit (Yearly) Next due on 1/15/2026      01/15/2025  Level of Service: [place] INITIAL ANNUAL WELLNESS VISIT-INCLUDES PPPS              Pneumococcal Vaccine: 65+ Years (Series Information) Completed      01/24/2017  Imm Admin: Pneumococcal Conjugate Vaccine (Prevnar/PCV-13)    12/01/2016  Imm Admin: Pneumococcal Conjugate Vaccine (Prevnar/PCV-13)    11/15/2013  Imm Admin: Pneumococcal polysaccharide vaccine (PPSV-23)              Influenza Vaccine (Series Information) Completed      09/18/2024  Outside Immunization: Influenza, High Dose    10/24/2023  Imm Admin: Influenza Vaccine Adult HD    10/17/2022  Imm Admin: Influenza Vaccine Adult HD    09/17/2021  Imm Admin: Influenza Vaccine Adult HD    09/14/2020  Imm Admin: Influenza Vaccine, Quadrivalent, Adjuvanted (Pf)    Only the first 5 history entries have been loaded, but more history exists.              COVID-19 Vaccine (Series Information) Completed      09/18/2024  Outside Immunization: COVID-19(PFR) 12yrs \T\ up    10/26/2023  Imm Admin: Covid-19 Mrna (Spikevax) Moderna 12+ Years    03/16/2023  Imm Admin: MODERNA BIVALENT BOOSTER SARS-COV-2 VACCINE  (6+)    04/27/2022  Imm Admin: PFIZER ZAVALA CAP SARS-COV-2 VACCINATION (12+)    09/21/2021  Imm Admin: PFIZER PURPLE CAP SARS-COV-2 VACCINATION (12+)    Only the first 5 history entries have been loaded, but more history exists.              Hepatitis A Vaccine (Hep A) (Series Information) Aged Out      No completion history exists for this topic.              Hepatitis B Vaccine (Hep B) (Series Information) Aged Out      No completion history exists for this topic.              HPV Vaccines (Series Information) Aged Out      No completion history exists for this topic.              Polio Vaccine (Inactivated Polio) (Series Information) Aged Out      No completion history exists for this topic.              Meningococcal Immunization (Series Information) Aged Out      No completion history exists for this topic.              Discontinued - Mammogram  Discontinued        Frequency changed to Never automatically (Topic No Longer Applies)    11/18/2021  MA-SCREENING MAMMO BILAT W/TOMOSYNTHESIS W/CAD    03/20/2014  MA-SCREENING MAMMOGRAM W/ CAD    06/27/2012  MA-SCREENING MAMMOGRAM W/ CAD    06/16/2011  MA-SCREEING MAMMOGRAM W/ CAD    Only the first 5 history entries have been loaded, but more history exists.                    Patient Care Team:  Akil SHEPPARD M.D. as PCP - General (Family Medicine)    Financial Resource Strain: Low Risk  (1/15/2025)    Overall Financial Resource Strain (CARDIA)     Difficulty of Paying Living Expenses: Not hard at all      Transportation Needs: No Transportation Needs (1/15/2025)    PRAPARE - Transportation     Lack of Transportation (Medical): No     Lack of Transportation (Non-Medical): No      Food Insecurity: No Food Insecurity (1/15/2025)    Hunger Vital Sign     Worried About Running Out of Food in the Last Year: Never true     Ran Out of Food in the Last Year: Never true        Encounter Vitals  Blood Pressure : 122/68  Pulse: 76  Respiration: 14  Pulse Oximetry: 96  "%  Weight: 78.5 kg (173 lb)  Height: 162.6 cm (5' 4\")  BMI (Calculated): 29.7  Pain Score: No pain     Physical Exam:  Constitutional: NAD  HENMT: NC/AT, EOMI  Cardiovascular: RRR, No m/r/g  Lungs: CTAB, no w/r/r  Extremities: No c/c/e  Skin: No lesions notes  Neurologic: Alert & oriented x3, CN II-XII grossly intact    Assessment and Plan. The following treatment and monitoring plan is recommended:    Dyslipidemia  Coronary artery calcification seen on CAT scan  Statin intolerance  Chronic, stable. Associated with evidence of coronary artery calcifications. Denies CP, dyspnea. Most recent lipid panel from 9/2017 with LDL at 105. Pt reports myalgias with statins Recommend Mediterranean diet and regular physical activity. Follow up with PCP at least annually for continued monitoring and management.   Lab Results   Component Value Date/Time    CHOLSTRLTOT 175 09/12/2017 04:58 AM     (H) 09/12/2017 04:58 AM    HDL 38 (A) 09/12/2017 04:58 AM    TRIGLYCERIDE 162 (H) 09/12/2017 04:58 AM       Primary hypertension  Chronic, stable. BP today 122/68. Pt monitors BP at home reporting she generally runs slightly higher at home. Denies dizziness/lightheadedness, chest pain, dyspnea. Continue current treatment regime: carvedilol 6.25mg BID, losartan 100mg daily. Follow up with PCP annually for continued monitoring and management.     Hereditary hemochromatosis (HCC)  Chronic, ongoing. Pt manages with routine phlebotomy. Managed by Dr Foster Follow up with GI per routine.   Latest Reference Range & Units 11/06/24 07:45   Iron 40 - 170 ug/dL 73   Total Iron Binding 250 - 450 ug/dL 250   % Saturation 15 - 55 % 29   Unsat Iron Binding 110 - 370 ug/dL 177     Stage 3b chronic kidney disease  Chronic, ongoing. Continue to encourage adequate hydration and avoidance of nephrotoxins. Follow up with PCP and nephrology per routine for continued monitoring and management.     Latest Reference Range & Units 11/12/24 09:20 12/19/24 " 07:58 12/19/24 07:59   Bun 8 - 22 mg/dL 31 (H) 27 (H) 27 (H)   Creatinine 0.50 - 1.40 mg/dL 1.90 (H) 1.49 (H) 1.50 (H)   GFR (CKD-EPI) >60 mL/min/1.73 m 2 26 ! 35 ! 35 !   (H): Data is abnormally high  !: Data is abnormal    History of fall  Pt reports a history of a fall while feeding fish next to a pond. She was on unfamiliar terrain and a large wind thi got the best of her. Unfortunately she suffered ankle sprain and muscle strains. She completed six weeks of PT and is doing well now. Continue to encourage importance of maintaining leg strength and balance. Discussed fall risk modifications.     Services suggested: No services needed at this time  Health Care Screening: Age-appropriate preventive services recommended by USPTF and ACIP covered by Medicare were discussed today. Services ordered if indicated and agreed upon by the patient.  Referrals offered: Community-based lifestyle interventions to reduce health risks and promote self-management and wellness, fall prevention, nutrition, physical activity, tobacco-use cessation, weight loss, and mental health services as per orders if indicated.    Discussion today about general wellness and lifestyle habits:    Prevent falls and reduce trip hazards; Cautioned about securing or removing rugs.  Have a working fire alarm and carbon monoxide detector.  Engage in regular physical activity and social activities.    Follow-up: No follow-ups on file.

## 2025-01-15 NOTE — ASSESSMENT & PLAN NOTE
Chronic, ongoing. Continue to encourage adequate hydration and avoidance of nephrotoxins. Follow up with PCP and nephrology per routine for continued monitoring and management.     Latest Reference Range & Units 11/12/24 09:20 12/19/24 07:58 12/19/24 07:59   Bun 8 - 22 mg/dL 31 (H) 27 (H) 27 (H)   Creatinine 0.50 - 1.40 mg/dL 1.90 (H) 1.49 (H) 1.50 (H)   GFR (CKD-EPI) >60 mL/min/1.73 m 2 26 ! 35 ! 35 !   (H): Data is abnormally high  !: Data is abnormal

## 2025-01-15 NOTE — ASSESSMENT & PLAN NOTE
Pt reports a history of a fall while feeding fish next to a pond. She was on unfamiliar terrain and a large wind thi got the best of her. Unfortunately she suffered ankle sprain and muscle strains. She completed six weeks of PT and is doing well now. Continue to encourage importance of maintaining leg strength and balance. Discussed fall risk modifications.

## 2025-01-21 ENCOUNTER — HOSPITAL ENCOUNTER (OUTPATIENT)
Dept: LAB | Facility: MEDICAL CENTER | Age: 80
End: 2025-01-21
Attending: NURSE PRACTITIONER
Payer: MEDICARE

## 2025-01-21 LAB
ALBUMIN SERPL BCP-MCNC: 4.3 G/DL (ref 3.2–4.9)
ALBUMIN/GLOB SERPL: 1.8 G/DL
ALP SERPL-CCNC: 47 U/L (ref 30–99)
ALT SERPL-CCNC: 10 U/L (ref 2–50)
ANION GAP SERPL CALC-SCNC: 12 MMOL/L (ref 7–16)
APPEARANCE UR: CLEAR
AST SERPL-CCNC: 18 U/L (ref 12–45)
BACTERIA #/AREA URNS HPF: ABNORMAL /HPF
BASOPHILS # BLD AUTO: 0.5 % (ref 0–1.8)
BASOPHILS # BLD: 0.03 K/UL (ref 0–0.12)
BILIRUB SERPL-MCNC: 0.4 MG/DL (ref 0.1–1.5)
BILIRUB UR QL STRIP.AUTO: NEGATIVE
BUN SERPL-MCNC: 28 MG/DL (ref 8–22)
CALCIUM ALBUM COR SERPL-MCNC: 9.2 MG/DL (ref 8.5–10.5)
CALCIUM SERPL-MCNC: 9.4 MG/DL (ref 8.5–10.5)
CASTS URNS QL MICRO: ABNORMAL /LPF (ref 0–2)
CHLORIDE SERPL-SCNC: 105 MMOL/L (ref 96–112)
CO2 SERPL-SCNC: 21 MMOL/L (ref 20–33)
COLOR UR: YELLOW
CREAT SERPL-MCNC: 1.66 MG/DL (ref 0.5–1.4)
EOSINOPHIL # BLD AUTO: 0.32 K/UL (ref 0–0.51)
EOSINOPHIL NFR BLD: 5.2 % (ref 0–6.9)
EPITHELIAL CELLS 1715: ABNORMAL /HPF (ref 0–5)
ERYTHROCYTE [DISTWIDTH] IN BLOOD BY AUTOMATED COUNT: 46.9 FL (ref 35.9–50)
GFR SERPLBLD CREATININE-BSD FMLA CKD-EPI: 31 ML/MIN/1.73 M 2
GLOBULIN SER CALC-MCNC: 2.4 G/DL (ref 1.9–3.5)
GLUCOSE SERPL-MCNC: 91 MG/DL (ref 65–99)
GLUCOSE UR STRIP.AUTO-MCNC: NEGATIVE MG/DL
HCT VFR BLD AUTO: 34.6 % (ref 37–47)
HGB BLD-MCNC: 11.5 G/DL (ref 12–16)
IMM GRANULOCYTES # BLD AUTO: 0.02 K/UL (ref 0–0.11)
IMM GRANULOCYTES NFR BLD AUTO: 0.3 % (ref 0–0.9)
KETONES UR STRIP.AUTO-MCNC: NEGATIVE MG/DL
LEUKOCYTE ESTERASE UR QL STRIP.AUTO: ABNORMAL
LYMPHOCYTES # BLD AUTO: 1.21 K/UL (ref 1–4.8)
LYMPHOCYTES NFR BLD: 19.8 % (ref 22–41)
MCH RBC QN AUTO: 30.7 PG (ref 27–33)
MCHC RBC AUTO-ENTMCNC: 33.2 G/DL (ref 32.2–35.5)
MCV RBC AUTO: 92.3 FL (ref 81.4–97.8)
MICRO URNS: ABNORMAL
MONOCYTES # BLD AUTO: 0.49 K/UL (ref 0–0.85)
MONOCYTES NFR BLD AUTO: 8 % (ref 0–13.4)
NEUTROPHILS # BLD AUTO: 4.04 K/UL (ref 1.82–7.42)
NEUTROPHILS NFR BLD: 66.2 % (ref 44–72)
NITRITE UR QL STRIP.AUTO: NEGATIVE
NRBC # BLD AUTO: 0 K/UL
NRBC BLD-RTO: 0 /100 WBC (ref 0–0.2)
PH UR STRIP.AUTO: 6.5 [PH] (ref 5–8)
PLATELET # BLD AUTO: 200 K/UL (ref 164–446)
PMV BLD AUTO: 11.4 FL (ref 9–12.9)
POTASSIUM SERPL-SCNC: 4.8 MMOL/L (ref 3.6–5.5)
PROT SERPL-MCNC: 6.7 G/DL (ref 6–8.2)
PROT UR QL STRIP: 100 MG/DL
RBC # BLD AUTO: 3.75 M/UL (ref 4.2–5.4)
RBC # URNS HPF: ABNORMAL /HPF (ref 0–2)
RBC UR QL AUTO: NEGATIVE
SODIUM SERPL-SCNC: 138 MMOL/L (ref 135–145)
SP GR UR STRIP.AUTO: 1.02
UROBILINOGEN UR STRIP.AUTO-MCNC: 0.2 EU/DL
WBC # BLD AUTO: 6.1 K/UL (ref 4.8–10.8)
WBC #/AREA URNS HPF: ABNORMAL /HPF

## 2025-01-21 PROCEDURE — 84156 ASSAY OF PROTEIN URINE: CPT

## 2025-01-21 PROCEDURE — 82570 ASSAY OF URINE CREATININE: CPT

## 2025-01-21 PROCEDURE — 85025 COMPLETE CBC W/AUTO DIFF WBC: CPT

## 2025-01-21 PROCEDURE — 80053 COMPREHEN METABOLIC PANEL: CPT

## 2025-01-21 PROCEDURE — 81001 URINALYSIS AUTO W/SCOPE: CPT

## 2025-01-21 PROCEDURE — 36415 COLL VENOUS BLD VENIPUNCTURE: CPT

## 2025-01-21 PROCEDURE — 82043 UR ALBUMIN QUANTITATIVE: CPT

## 2025-01-22 LAB
CREAT UR-MCNC: 114.09 MG/DL
PROT UR-MCNC: 91 MG/DL (ref 0–15)
PROT/CREAT UR: 798 MG/G (ref 10–107)

## 2025-01-23 LAB
COLLECT DURATION TIME SPEC: ABNORMAL HR
CREAT 24H UR-MCNC: 122 MG/DL
MICROALBUMIN 24H UR-MCNC: 65.8 MG/DL
MICROALBUMIN/CREAT 24H UR: 539 MG/G (ref 0–30)
SPECIMEN VOL ?TM UR: ABNORMAL ML

## 2025-02-06 ENCOUNTER — HOSPITAL ENCOUNTER (OUTPATIENT)
Dept: RADIOLOGY | Facility: MEDICAL CENTER | Age: 80
End: 2025-02-06
Attending: PHYSICIAN ASSISTANT
Payer: MEDICARE

## 2025-02-06 DIAGNOSIS — Z78.0 POSTMENOPAUSAL: ICD-10-CM

## 2025-02-06 PROCEDURE — 77080 DXA BONE DENSITY AXIAL: CPT

## 2025-02-11 ENCOUNTER — DOCUMENTATION (OUTPATIENT)
Dept: FAMILY PLANNING/WOMEN'S HEALTH CLINIC | Facility: PHYSICIAN GROUP | Age: 80
End: 2025-02-11
Payer: MEDICARE

## 2025-02-18 ENCOUNTER — HOSPITAL ENCOUNTER (OUTPATIENT)
Dept: LAB | Facility: MEDICAL CENTER | Age: 80
End: 2025-02-18
Attending: STUDENT IN AN ORGANIZED HEALTH CARE EDUCATION/TRAINING PROGRAM
Payer: MEDICARE

## 2025-02-18 LAB
APPEARANCE UR: CLEAR
BACTERIA #/AREA URNS HPF: ABNORMAL /HPF
BILIRUB UR QL STRIP.AUTO: NEGATIVE
CASTS URNS QL MICRO: ABNORMAL /LPF (ref 0–2)
COLOR UR: YELLOW
EPITHELIAL CELLS 1715: ABNORMAL /HPF (ref 0–5)
GLUCOSE UR STRIP.AUTO-MCNC: NEGATIVE MG/DL
KETONES UR STRIP.AUTO-MCNC: NEGATIVE MG/DL
LEUKOCYTE ESTERASE UR QL STRIP.AUTO: NEGATIVE
MICRO URNS: ABNORMAL
NITRITE UR QL STRIP.AUTO: NEGATIVE
PH UR STRIP.AUTO: 6 [PH] (ref 5–8)
PROT UR QL STRIP: 100 MG/DL
PTH-INTACT SERPL-MCNC: 57.9 PG/ML (ref 14–72)
RBC # URNS HPF: ABNORMAL /HPF (ref 0–2)
RBC UR QL AUTO: NEGATIVE
SP GR UR STRIP.AUTO: 1.02
UROBILINOGEN UR STRIP.AUTO-MCNC: 0.2 EU/DL
WBC #/AREA URNS HPF: ABNORMAL /HPF

## 2025-02-18 PROCEDURE — 80053 COMPREHEN METABOLIC PANEL: CPT

## 2025-02-18 PROCEDURE — 83970 ASSAY OF PARATHORMONE: CPT

## 2025-02-18 PROCEDURE — 84100 ASSAY OF PHOSPHORUS: CPT

## 2025-02-18 PROCEDURE — 82306 VITAMIN D 25 HYDROXY: CPT

## 2025-02-18 PROCEDURE — 81001 URINALYSIS AUTO W/SCOPE: CPT

## 2025-02-18 PROCEDURE — 36415 COLL VENOUS BLD VENIPUNCTURE: CPT

## 2025-02-19 LAB
25(OH)D3 SERPL-MCNC: 57 NG/ML (ref 30–100)
ALBUMIN SERPL BCP-MCNC: 4.2 G/DL (ref 3.2–4.9)
ALBUMIN/GLOB SERPL: 1.8 G/DL
ALP SERPL-CCNC: 47 U/L (ref 30–99)
ALT SERPL-CCNC: 10 U/L (ref 2–50)
ANION GAP SERPL CALC-SCNC: 11 MMOL/L (ref 7–16)
AST SERPL-CCNC: 17 U/L (ref 12–45)
BILIRUB SERPL-MCNC: 0.4 MG/DL (ref 0.1–1.5)
BUN SERPL-MCNC: 46 MG/DL (ref 8–22)
CALCIUM ALBUM COR SERPL-MCNC: 9.2 MG/DL (ref 8.5–10.5)
CALCIUM SERPL-MCNC: 9.4 MG/DL (ref 8.5–10.5)
CHLORIDE SERPL-SCNC: 107 MMOL/L (ref 96–112)
CO2 SERPL-SCNC: 19 MMOL/L (ref 20–33)
CREAT SERPL-MCNC: 1.86 MG/DL (ref 0.5–1.4)
GFR SERPLBLD CREATININE-BSD FMLA CKD-EPI: 27 ML/MIN/1.73 M 2
GLOBULIN SER CALC-MCNC: 2.3 G/DL (ref 1.9–3.5)
GLUCOSE SERPL-MCNC: 93 MG/DL (ref 65–99)
PHOSPHATE SERPL-MCNC: 3.4 MG/DL (ref 2.5–4.5)
POTASSIUM SERPL-SCNC: 4.8 MMOL/L (ref 3.6–5.5)
PROT SERPL-MCNC: 6.5 G/DL (ref 6–8.2)
SODIUM SERPL-SCNC: 137 MMOL/L (ref 135–145)

## 2025-02-27 ENCOUNTER — OFFICE VISIT (OUTPATIENT)
Dept: MEDICAL GROUP | Age: 80
End: 2025-02-27
Payer: MEDICARE

## 2025-02-27 ENCOUNTER — HOSPITAL ENCOUNTER (OUTPATIENT)
Dept: RADIOLOGY | Facility: MEDICAL CENTER | Age: 80
End: 2025-02-27
Attending: STUDENT IN AN ORGANIZED HEALTH CARE EDUCATION/TRAINING PROGRAM
Payer: MEDICARE

## 2025-02-27 VITALS
HEART RATE: 80 BPM | SYSTOLIC BLOOD PRESSURE: 140 MMHG | HEIGHT: 64 IN | DIASTOLIC BLOOD PRESSURE: 72 MMHG | WEIGHT: 174.8 LBS | TEMPERATURE: 98.7 F | BODY MASS INDEX: 29.84 KG/M2 | OXYGEN SATURATION: 97 %

## 2025-02-27 DIAGNOSIS — I10 PRIMARY HYPERTENSION: ICD-10-CM

## 2025-02-27 DIAGNOSIS — E83.110 HEREDITARY HEMOCHROMATOSIS (HCC): ICD-10-CM

## 2025-02-27 DIAGNOSIS — N18.32 STAGE 3B CHRONIC KIDNEY DISEASE: ICD-10-CM

## 2025-02-27 DIAGNOSIS — N25.81 SECONDARY HYPERPARATHYROIDISM OF RENAL ORIGIN (HCC): ICD-10-CM

## 2025-02-27 DIAGNOSIS — Z23 NEED FOR VACCINATION: ICD-10-CM

## 2025-02-27 DIAGNOSIS — E78.5 DYSLIPIDEMIA: ICD-10-CM

## 2025-02-27 DIAGNOSIS — N18.31 CHRONIC KIDNEY DISEASE, STAGE 3A: ICD-10-CM

## 2025-02-27 PROBLEM — M19.90 UNSPECIFIED OSTEOARTHRITIS, UNSPECIFIED SITE: Status: ACTIVE | Noted: 2024-07-30

## 2025-02-27 PROBLEM — E66.9 OBESITY: Status: ACTIVE | Noted: 2025-02-27

## 2025-02-27 PROBLEM — I12.9 HYPERTENSIVE CHRONIC KIDNEY DISEASE WITH STAGE 1 THROUGH STAGE 4 CHRONIC KIDNEY DISEASE, OR UNSPECIFIED CHRONIC KIDNEY DISEASE: Status: ACTIVE | Noted: 2024-07-30

## 2025-02-27 PROCEDURE — 76775 US EXAM ABDO BACK WALL LIM: CPT

## 2025-02-27 PROCEDURE — 99214 OFFICE O/P EST MOD 30 MIN: CPT | Mod: 25 | Performed by: STUDENT IN AN ORGANIZED HEALTH CARE EDUCATION/TRAINING PROGRAM

## 2025-02-27 PROCEDURE — 3078F DIAST BP <80 MM HG: CPT | Performed by: STUDENT IN AN ORGANIZED HEALTH CARE EDUCATION/TRAINING PROGRAM

## 2025-02-27 PROCEDURE — 3077F SYST BP >= 140 MM HG: CPT | Performed by: STUDENT IN AN ORGANIZED HEALTH CARE EDUCATION/TRAINING PROGRAM

## 2025-02-27 PROCEDURE — 90715 TDAP VACCINE 7 YRS/> IM: CPT | Performed by: STUDENT IN AN ORGANIZED HEALTH CARE EDUCATION/TRAINING PROGRAM

## 2025-02-27 PROCEDURE — 90471 IMMUNIZATION ADMIN: CPT | Performed by: STUDENT IN AN ORGANIZED HEALTH CARE EDUCATION/TRAINING PROGRAM

## 2025-02-27 RX ORDER — VIT C/B6/B5/MAGNESIUM/HERB 173 50-5-6-5MG
500 CAPSULE ORAL DAILY
COMMUNITY
Start: 2025-01-24

## 2025-02-27 RX ORDER — ACETAMINOPHEN 160 MG
TABLET,DISINTEGRATING ORAL DAILY
COMMUNITY
Start: 2024-12-23

## 2025-02-27 ASSESSMENT — FIBROSIS 4 INDEX: FIB4 SCORE: 2.18

## 2025-02-27 NOTE — PROGRESS NOTES
Verbal consent was acquired by the patient to use [employer] Text ambient listening note generation during this visit     Subjective:     HPI:   History of Present Illness  The patient is an 79-year-old female who is here to establish care.    She has a history of elevated blood pressure, which has been managed by her nephrologist. She was previously on carvedilol for 6 months but experienced adverse effects such as fatigue, shortness of breath, and decreased endurance, leading to its discontinuation. She reports no thyroid issues and does not take levothyroxine or potassium supplements. Her current antihypertensive regimen includes valsartan 320 mg, furosemide, and amlodipine 5 mg twice daily.    She has a longstanding history of stage 3 kidney disease, diagnosed approximately 15 to 20 years ago, attributed to arthritis medication use. Despite efforts to maintain her kidney function at stage 3, recent reports indicate progression to stage 4. She is under the care of a new nephrologist who has ordered extensive testing. An ultrasound of her kidneys is scheduled for this afternoon. She has an upcoming appointment with her nephrologist on 03/21/2025.    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 Mccullough, an internist. She sees him annually and his nurse practitioner biannually for blood tests. Her condition is well-controlled through regular blood donations, initially every 2 months and currently every 3 months. Her last blood donation was on 11/08/2024. She does not have a hematologist and is considering whether she should consult one.    She has a history of atrial fibrillation for just over a year, managed by Dr. Potter, a cardiologist. She underwent an EKG and echocardiogram in 07/2024 or 08/2024, which were unremarkable. She had a follow-up with Dr. Potter in 05/2024 and plans to schedule another appointment in 05/2025.    She is uncertain about her tetanus vaccination  "status but believes she has not received one in the past 5 years. She is up-to-date with her COVID-19 and influenza vaccines. She contracted COVID-19 last spring, experienced a fever for one day, and was treated with Paxlovid at an urgent care facility. She has had two primary care physicians over the past 5 years, Dr. Mondragon and Dr. Madrid.    Supplemental Information  She has a history of arthritis and manages it with Tylenol. She experienced a fall last year while feeding her goldfish, resulting in an ankle sprain, ligament tear, knee muscle strain, and hamstring hyperextension. She was on crutches for 2 weeks, followed by 2 weeks of single crutch use, and completed 8 weeks of physical therapy.    FAMILY HISTORY  The patient's mother lived to  and  of congestive heart failure and kidney failure. The patient's father  from an aortic aneurysm. No family history of cancer.    ALLERGIES  The patient is allergic to STATINS.    MEDICATIONS  Current: Valsartan, furosemide, amlodipine, Tylenol.  Discontinued: Carvedilol, losartan, potassium.    IMMUNIZATIONS  The patient is up to date on COVID-19 and influenza vaccines.    Health Maintenance: utd    No n/v/d/c, fever, chills, sob, chest pain      Objective:     Exam:  BP (!) 140/72 (BP Location: Right arm, Patient Position: Sitting, BP Cuff Size: Adult)   Pulse 80   Temp 37.1 °C (98.7 °F) (Temporal)   Ht 1.626 m (5' 4\")   Wt 79.3 kg (174 lb 12.8 oz)   SpO2 97%   BMI 30.00 kg/m²  Body mass index is 30 kg/m².    Physical Exam    Gen: nad  HENT: ncat, EOMI  Resp: ctabl  Cardiac: rrr, no m  GI: nt/nd  Neuro: no focal deficits, cn 2-12 intact throughout, sensation to light touch intact throughout  Psych: appropriate mood and affect      Results      Assessment & Plan:     1. Primary hypertension        2. Stage 3b chronic kidney disease        3. Secondary hyperparathyroidism of renal origin (HCC)        4. Hereditary hemochromatosis (HCC)        5. " Dyslipidemia        6. Need for vaccination  Tdap Vaccine =>8YO IM          Assessment & Plan  1. Hypertension.  Her blood pressure has been fluctuating, and she is currently on valsartan, furosemide, and amlodipine 5 mg twice daily. The dosages need to be verified as there seems to be a discrepancy in the chart. She will bring her pill bottles to the next visit for verification.  She had an echocardiogram last summer, which showed no immediate concerns. She will follow up with her cardiologist, Dr. Potter, in May.    2. Chronic Kidney Disease Stage 3b  She is scheduled for a renal ultrasound this afternoon. She will continue to follow up with her nephrologist, Dr. Oconnell, and has an appointment on March 21.    3. Familial Hemochromatosis.  Her condition is currently under control with regular blood donations. She has been advised to stop donating blood due to a low red blood count. She will continue to follow up with Dr. Mccullough who is GI/Liver disease specialist and his nurse practitioner.    4. Health Maintenance.  She will receive a tetanus vaccine today. She is up to date on her COVID-19 and flu shots.    PROCEDURE  The patient underwent tubal ligation, appendectomy in the 1970s, and tonsillectomy.          Return in about 4 months (around 6/27/2025) for annual visit, lab results.    Please note that this dictation was created using voice recognition software. I have made every reasonable attempt to correct obvious errors, but I expect that there are errors of grammar and possibly content that I did not discover before finalizing the note.

## 2025-03-17 ENCOUNTER — HOSPITAL ENCOUNTER (OUTPATIENT)
Dept: LAB | Facility: MEDICAL CENTER | Age: 80
End: 2025-03-17
Attending: STUDENT IN AN ORGANIZED HEALTH CARE EDUCATION/TRAINING PROGRAM
Payer: MEDICARE

## 2025-03-17 LAB
ALBUMIN SERPL BCP-MCNC: 4.2 G/DL (ref 3.2–4.9)
ALBUMIN/GLOB SERPL: 1.7 G/DL
ALP SERPL-CCNC: 51 U/L (ref 30–99)
ALT SERPL-CCNC: 11 U/L (ref 2–50)
ANION GAP SERPL CALC-SCNC: 11 MMOL/L (ref 7–16)
AST SERPL-CCNC: 18 U/L (ref 12–45)
BASOPHILS # BLD AUTO: 0.5 % (ref 0–1.8)
BASOPHILS # BLD: 0.03 K/UL (ref 0–0.12)
BILIRUB SERPL-MCNC: 0.5 MG/DL (ref 0.1–1.5)
BUN SERPL-MCNC: 38 MG/DL (ref 8–22)
C3 SERPL-MCNC: 133 MG/DL (ref 87–200)
C4 SERPL-MCNC: 27.9 MG/DL (ref 19–52)
CALCIUM ALBUM COR SERPL-MCNC: 9.4 MG/DL (ref 8.5–10.5)
CALCIUM SERPL-MCNC: 9.6 MG/DL (ref 8.5–10.5)
CHLORIDE SERPL-SCNC: 105 MMOL/L (ref 96–112)
CO2 SERPL-SCNC: 20 MMOL/L (ref 20–33)
CREAT SERPL-MCNC: 2.12 MG/DL (ref 0.5–1.4)
CREAT UR-MCNC: 91 MG/DL
EOSINOPHIL # BLD AUTO: 0.26 K/UL (ref 0–0.51)
EOSINOPHIL NFR BLD: 4.3 % (ref 0–6.9)
ERYTHROCYTE [DISTWIDTH] IN BLOOD BY AUTOMATED COUNT: 49.6 FL (ref 35.9–50)
EST. AVERAGE GLUCOSE BLD GHB EST-MCNC: 114 MG/DL
FERRITIN SERPL-MCNC: 33.2 NG/ML (ref 10–291)
GFR SERPLBLD CREATININE-BSD FMLA CKD-EPI: 23 ML/MIN/1.73 M 2
GLOBULIN SER CALC-MCNC: 2.5 G/DL (ref 1.9–3.5)
GLUCOSE SERPL-MCNC: 94 MG/DL (ref 65–99)
HBA1C MFR BLD: 5.6 % (ref 4–5.6)
HBV SURFACE AB SERPL IA-ACNC: <3.5 MIU/ML (ref 0–10)
HCT VFR BLD AUTO: 35.8 % (ref 37–47)
HCV AB SER QL: NORMAL
HGB BLD-MCNC: 11.7 G/DL (ref 12–16)
IMM GRANULOCYTES # BLD AUTO: 0.03 K/UL (ref 0–0.11)
IMM GRANULOCYTES NFR BLD AUTO: 0.5 % (ref 0–0.9)
IRON SATN MFR SERPL: 41 % (ref 15–55)
IRON SERPL-MCNC: 109 UG/DL (ref 40–170)
LYMPHOCYTES # BLD AUTO: 1.18 K/UL (ref 1–4.8)
LYMPHOCYTES NFR BLD: 19.7 % (ref 22–41)
MCH RBC QN AUTO: 30.5 PG (ref 27–33)
MCHC RBC AUTO-ENTMCNC: 32.7 G/DL (ref 32.2–35.5)
MCV RBC AUTO: 93.5 FL (ref 81.4–97.8)
MONOCYTES # BLD AUTO: 0.53 K/UL (ref 0–0.85)
MONOCYTES NFR BLD AUTO: 8.8 % (ref 0–13.4)
NEUTROPHILS # BLD AUTO: 3.96 K/UL (ref 1.82–7.42)
NEUTROPHILS NFR BLD: 66.2 % (ref 44–72)
NRBC # BLD AUTO: 0 K/UL
NRBC BLD-RTO: 0 /100 WBC (ref 0–0.2)
PLATELET # BLD AUTO: 185 K/UL (ref 164–446)
PMV BLD AUTO: 12.2 FL (ref 9–12.9)
POTASSIUM SERPL-SCNC: 4.9 MMOL/L (ref 3.6–5.5)
PROT SERPL-MCNC: 6.7 G/DL (ref 6–8.2)
PROT UR-MCNC: 41.2 MG/DL (ref 0–15)
PROT/CREAT UR: 453 MG/G (ref 10–107)
PTH-INTACT SERPL-MCNC: 49.8 PG/ML (ref 14–72)
RBC # BLD AUTO: 3.83 M/UL (ref 4.2–5.4)
SODIUM SERPL-SCNC: 136 MMOL/L (ref 135–145)
TIBC SERPL-MCNC: 264 UG/DL (ref 250–450)
UIBC SERPL-MCNC: 155 UG/DL (ref 110–370)
WBC # BLD AUTO: 6 K/UL (ref 4.8–10.8)

## 2025-03-17 PROCEDURE — 36415 COLL VENOUS BLD VENIPUNCTURE: CPT

## 2025-03-17 PROCEDURE — 85025 COMPLETE CBC W/AUTO DIFF WBC: CPT

## 2025-03-17 PROCEDURE — 84155 ASSAY OF PROTEIN SERUM: CPT | Mod: 59

## 2025-03-17 PROCEDURE — 82043 UR ALBUMIN QUANTITATIVE: CPT

## 2025-03-17 PROCEDURE — 86255 FLUORESCENT ANTIBODY SCREEN: CPT

## 2025-03-17 PROCEDURE — 82570 ASSAY OF URINE CREATININE: CPT

## 2025-03-17 PROCEDURE — 83521 IG LIGHT CHAINS FREE EACH: CPT

## 2025-03-17 PROCEDURE — 86160 COMPLEMENT ANTIGEN: CPT | Mod: 91

## 2025-03-17 PROCEDURE — 83036 HEMOGLOBIN GLYCOSYLATED A1C: CPT

## 2025-03-17 PROCEDURE — 86803 HEPATITIS C AB TEST: CPT

## 2025-03-17 PROCEDURE — 82728 ASSAY OF FERRITIN: CPT

## 2025-03-17 PROCEDURE — 81001 URINALYSIS AUTO W/SCOPE: CPT

## 2025-03-17 PROCEDURE — 83516 IMMUNOASSAY NONANTIBODY: CPT | Mod: 91

## 2025-03-17 PROCEDURE — 86706 HEP B SURFACE ANTIBODY: CPT

## 2025-03-17 PROCEDURE — 84165 PROTEIN E-PHORESIS SERUM: CPT

## 2025-03-17 PROCEDURE — 83540 ASSAY OF IRON: CPT

## 2025-03-17 PROCEDURE — 84156 ASSAY OF PROTEIN URINE: CPT

## 2025-03-17 PROCEDURE — 83970 ASSAY OF PARATHORMONE: CPT

## 2025-03-17 PROCEDURE — 86038 ANTINUCLEAR ANTIBODIES: CPT

## 2025-03-17 PROCEDURE — 82785 ASSAY OF IGE: CPT

## 2025-03-17 PROCEDURE — 83550 IRON BINDING TEST: CPT

## 2025-03-17 PROCEDURE — 80053 COMPREHEN METABOLIC PANEL: CPT

## 2025-03-18 LAB
BM IGG SER QL IF: NEGATIVE
IGE SERPL-ACNC: 18 KU/L
KAPPA LC FREE SER-MCNC: 22.6 MG/L (ref 3.3–19.4)
KAPPA LC FREE/LAMBDA FREE SER NEPH: 1.25 {RATIO} (ref 0.26–1.65)
LAMBDA LC FREE SERPL-MCNC: 18.02 MG/L (ref 5.71–26.3)
MYELOPEROXIDASE AB SER-ACNC: 0 AU/ML (ref 0–19)
NUCLEAR IGG SER QL IA: NORMAL
PLA2R IGG SERPL QL IF: NORMAL
PROTEINASE3 AB SER-ACNC: 0 AU/ML (ref 0–19)

## 2025-03-19 LAB
APPEARANCE UR: CLEAR
BACTERIA #/AREA URNS HPF: NORMAL /HPF
BILIRUB UR QL STRIP.AUTO: NEGATIVE
CASTS URNS QL MICRO: NORMAL /LPF (ref 0–2)
COLOR UR: YELLOW
EPITHELIAL CELLS 1715: NORMAL /HPF (ref 0–5)
GLUCOSE UR STRIP.AUTO-MCNC: NEGATIVE MG/DL
KETONES UR STRIP.AUTO-MCNC: NEGATIVE MG/DL
LEUKOCYTE ESTERASE UR QL STRIP.AUTO: ABNORMAL
MICRO URNS: ABNORMAL
NITRITE UR QL STRIP.AUTO: NEGATIVE
PH UR STRIP.AUTO: 6.5 [PH] (ref 5–8)
PROT UR QL STRIP: 30 MG/DL
RBC # URNS HPF: NORMAL /HPF (ref 0–2)
RBC UR QL AUTO: NEGATIVE
SP GR UR STRIP.AUTO: 1.01
UROBILINOGEN UR STRIP.AUTO-MCNC: 0.2 EU/DL
WBC #/AREA URNS HPF: NORMAL /HPF

## 2025-03-20 LAB
ALBUMIN SERPL ELPH-MCNC: 4.12 G/DL (ref 3.75–5.01)
ALPHA1 GLOB SERPL ELPH-MCNC: 0.26 G/DL (ref 0.19–0.46)
ALPHA2 GLOB SERPL ELPH-MCNC: 0.84 G/DL (ref 0.48–1.05)
B-GLOBULIN SERPL ELPH-MCNC: 0.63 G/DL (ref 0.48–1.1)
COLLECT DURATION TIME SPEC: ABNORMAL HR
CREAT 24H UR-MCNC: 89 MG/DL
EER PROT ELECT SER Q1092: ABNORMAL
GAMMA GLOB SERPL ELPH-MCNC: 0.55 G/DL (ref 0.62–1.51)
INTERPRETATION SERPL IFE-IMP: ABNORMAL
MICROALBUMIN 24H UR-MCNC: 24.4 MG/DL
MICROALBUMIN/CREAT 24H UR: 274 MG/G (ref 0–30)
MONOCLONAL PROTEIN NL11656: ABNORMAL G/DL
PROT SERPL-MCNC: 6.4 G/DL (ref 6.3–8.2)
SPECIMEN VOL ?TM UR: ABNORMAL ML

## 2025-03-31 ENCOUNTER — PRE-ADMISSION TESTING (OUTPATIENT)
Dept: ADMISSIONS | Facility: MEDICAL CENTER | Age: 80
End: 2025-03-31
Attending: INTERNAL MEDICINE
Payer: MEDICARE

## 2025-03-31 VITALS — BODY MASS INDEX: 30 KG/M2 | HEIGHT: 64 IN

## 2025-03-31 DIAGNOSIS — Z01.812 PRE-OPERATIVE LABORATORY EXAMINATION: ICD-10-CM

## 2025-03-31 RX ORDER — ACETAMINOPHEN/DIPHENHYDRAMINE 500MG-25MG
1 TABLET ORAL
COMMUNITY

## 2025-03-31 RX ORDER — OMEPRAZOLE 20 MG/1
20 CAPSULE, DELAYED RELEASE ORAL DAILY
COMMUNITY

## 2025-03-31 NOTE — PREPROCEDURE INSTRUCTIONS
PreAdmit Telephone Appointment: Pt instructed to follow [facility] medication instructions.    Confirmed where to check in.

## 2025-04-01 ENCOUNTER — APPOINTMENT (OUTPATIENT)
Dept: ADMISSIONS | Facility: MEDICAL CENTER | Age: 80
End: 2025-04-01
Attending: INTERNAL MEDICINE
Payer: MEDICARE

## 2025-04-01 DIAGNOSIS — Z01.812 PRE-OPERATIVE LABORATORY EXAMINATION: ICD-10-CM

## 2025-04-01 LAB
ERYTHROCYTE [DISTWIDTH] IN BLOOD BY AUTOMATED COUNT: 50.7 FL (ref 35.9–50)
HCT VFR BLD AUTO: 36.2 % (ref 37–47)
HGB BLD-MCNC: 11.6 G/DL (ref 12–16)
INR PPP: 1.01 (ref 0.87–1.13)
MCH RBC QN AUTO: 30.8 PG (ref 27–33)
MCHC RBC AUTO-ENTMCNC: 32 G/DL (ref 32.2–35.5)
MCV RBC AUTO: 96 FL (ref 81.4–97.8)
PLATELET # BLD AUTO: 191 K/UL (ref 164–446)
PMV BLD AUTO: 11.3 FL (ref 9–12.9)
PROTHROMBIN TIME: 13.6 SEC (ref 12–14.6)
RBC # BLD AUTO: 3.77 M/UL (ref 4.2–5.4)
WBC # BLD AUTO: 5.6 K/UL (ref 4.8–10.8)

## 2025-04-01 PROCEDURE — 85610 PROTHROMBIN TIME: CPT

## 2025-04-01 PROCEDURE — 85027 COMPLETE CBC AUTOMATED: CPT

## 2025-04-01 PROCEDURE — 36415 COLL VENOUS BLD VENIPUNCTURE: CPT

## 2025-04-04 ENCOUNTER — HOSPITAL ENCOUNTER (OUTPATIENT)
Facility: MEDICAL CENTER | Age: 80
End: 2025-04-04
Attending: INTERNAL MEDICINE | Admitting: INTERNAL MEDICINE
Payer: MEDICARE

## 2025-04-04 ENCOUNTER — APPOINTMENT (OUTPATIENT)
Dept: RADIOLOGY | Facility: MEDICAL CENTER | Age: 80
End: 2025-04-04
Attending: INTERNAL MEDICINE
Payer: MEDICARE

## 2025-04-04 VITALS
OXYGEN SATURATION: 99 % | DIASTOLIC BLOOD PRESSURE: 75 MMHG | WEIGHT: 173.06 LBS | HEART RATE: 58 BPM | BODY MASS INDEX: 29.55 KG/M2 | RESPIRATION RATE: 17 BRPM | SYSTOLIC BLOOD PRESSURE: 129 MMHG | HEIGHT: 64 IN | TEMPERATURE: 96.5 F

## 2025-04-04 DIAGNOSIS — N18.4 CHRONIC KIDNEY DISEASE, STAGE IV (SEVERE) (HCC): ICD-10-CM

## 2025-04-04 DIAGNOSIS — R80.9: ICD-10-CM

## 2025-04-04 PROCEDURE — 160025 RECOVERY II MINUTES (STATS)

## 2025-04-04 PROCEDURE — 88300 SURGICAL PATH GROSS: CPT | Performed by: PATHOLOGY

## 2025-04-04 PROCEDURE — 4401636 CT-NEEDLE CORE BX-RENAL

## 2025-04-04 PROCEDURE — 160015 HCHG STAT PREOP MINUTES

## 2025-04-04 PROCEDURE — 88300 SURGICAL PATH GROSS: CPT | Mod: 26 | Performed by: PATHOLOGY

## 2025-04-04 PROCEDURE — 160002 HCHG RECOVERY MINUTES (STAT)

## 2025-04-04 PROCEDURE — 88329 PATH CONSLTJ DRG SURG: CPT | Performed by: PATHOLOGY

## 2025-04-04 PROCEDURE — 700111 HCHG RX REV CODE 636 W/ 250 OVERRIDE (IP): Mod: JZ

## 2025-04-04 PROCEDURE — 700111 HCHG RX REV CODE 636 W/ 250 OVERRIDE (IP): Mod: JZ | Performed by: RADIOLOGY

## 2025-04-04 RX ORDER — OXYCODONE HYDROCHLORIDE 5 MG/1
10 TABLET ORAL
Status: DISCONTINUED | OUTPATIENT
Start: 2025-04-04 | End: 2025-04-04 | Stop reason: HOSPADM

## 2025-04-04 RX ORDER — HYDRALAZINE HYDROCHLORIDE 20 MG/ML
20 INJECTION INTRAMUSCULAR; INTRAVENOUS EVERY 6 HOURS PRN
Status: DISCONTINUED | OUTPATIENT
Start: 2025-04-04 | End: 2025-04-04 | Stop reason: HOSPADM

## 2025-04-04 RX ORDER — MIDAZOLAM HYDROCHLORIDE 1 MG/ML
INJECTION INTRAMUSCULAR; INTRAVENOUS
Status: COMPLETED
Start: 2025-04-04 | End: 2025-04-04

## 2025-04-04 RX ORDER — ONDANSETRON 2 MG/ML
4 INJECTION INTRAMUSCULAR; INTRAVENOUS EVERY 8 HOURS PRN
Status: DISCONTINUED | OUTPATIENT
Start: 2025-04-04 | End: 2025-04-04 | Stop reason: HOSPADM

## 2025-04-04 RX ORDER — ONDANSETRON 2 MG/ML
4 INJECTION INTRAMUSCULAR; INTRAVENOUS PRN
Status: DISCONTINUED | OUTPATIENT
Start: 2025-04-04 | End: 2025-04-04 | Stop reason: HOSPADM

## 2025-04-04 RX ORDER — MIDAZOLAM HYDROCHLORIDE 1 MG/ML
.5-2 INJECTION INTRAMUSCULAR; INTRAVENOUS PRN
Status: DISCONTINUED | OUTPATIENT
Start: 2025-04-04 | End: 2025-04-04 | Stop reason: HOSPADM

## 2025-04-04 RX ORDER — MORPHINE SULFATE 4 MG/ML
4 INJECTION INTRAVENOUS
Status: DISCONTINUED | OUTPATIENT
Start: 2025-04-04 | End: 2025-04-04 | Stop reason: HOSPADM

## 2025-04-04 RX ORDER — SODIUM CHLORIDE 9 MG/ML
500 INJECTION, SOLUTION INTRAVENOUS
Status: DISCONTINUED | OUTPATIENT
Start: 2025-04-04 | End: 2025-04-04 | Stop reason: HOSPADM

## 2025-04-04 RX ORDER — OXYCODONE HYDROCHLORIDE 5 MG/1
5 TABLET ORAL
Status: DISCONTINUED | OUTPATIENT
Start: 2025-04-04 | End: 2025-04-04 | Stop reason: HOSPADM

## 2025-04-04 RX ADMIN — FENTANYL CITRATE 50 MCG: 50 INJECTION, SOLUTION INTRAMUSCULAR; INTRAVENOUS at 08:10

## 2025-04-04 RX ADMIN — MIDAZOLAM HYDROCHLORIDE 1 MG: 1 INJECTION, SOLUTION INTRAMUSCULAR; INTRAVENOUS at 08:10

## 2025-04-04 RX ADMIN — FENTANYL CITRATE 25 MCG: 50 INJECTION, SOLUTION INTRAMUSCULAR; INTRAVENOUS at 08:15

## 2025-04-04 RX ADMIN — FENTANYL CITRATE 25 MCG: 50 INJECTION, SOLUTION INTRAMUSCULAR; INTRAVENOUS at 08:24

## 2025-04-04 RX ADMIN — MIDAZOLAM HYDROCHLORIDE 1 MG: 1 INJECTION, SOLUTION INTRAMUSCULAR; INTRAVENOUS at 08:15

## 2025-04-04 ASSESSMENT — FIBROSIS 4 INDEX: FIB4 SCORE: 2.3

## 2025-04-04 NOTE — PROGRESS NOTES
Pt presents to [facility]. Pt was consented by MD at bedside, confirmed by this RN and consent signed at bedside. Pt transferred to procedure table in prone position. Patient underwent a kidney biopsy by Dr. Castro. Procedure site was marked by MD and verified using imaging guidance. Pt placed on monitor, prepped and draped in a sterile fashion. Vitals were taken every 5 minutes and remained stable during procedure (see doc flow sheet for results). CO2 waveform capnography was monitored and remained WNL throughout procedure. Report called to FIDEL Mitchell. Pt transported by stretcher with RN to [facility].     Specimen: 2 cores on slides handed to Path

## 2025-04-04 NOTE — DISCHARGE INSTRUCTIONS
Care After Percutaneous Kidney Biopsy     This sheet gives you information about how to care for yourself after your procedure. Your health care provider may also give you more specific instructions. If you have problems or questions, contact your health care provider.     What can I expect after the procedure?     After the procedure, it is common to have:    Pain or soreness near the biopsy site.    Pink or cloudy urine for 24 hours after the procedure. This is normal.     Follow these instructions at home:     Activity    Return to your normal activities as told by your health care provider. Ask your health care provider what activities are safe for you.    If you were given a sedative during the procedure, it can affect you for several hours. Do not drive or operate machinery for 24 hours.    Do not lift anything that is heavier than 10 lbs for 7-10 days.     Heavy lifting, strenuous exercise, including contact sports, and sexual intercourse should be avoided for two weeks after the biopsy.     General instructions    Take over-the-counter and prescription medicines only as told by your health care provider.    Leave the dressing in place for 24 hours and do not shower while dressing is in place. Do not submerge the biopsy site in water (baths, lakes, hot tubs) for 5 days.       Check your biopsy site every day for signs of infection. Check for:     More redness, swelling, or pain.     Fluid or blood.     Warmth.     Pus or a bad smell.      Keep all follow-up visits as told by your health care provider. This is important.     Contact a health care provider if:    You have more redness, swelling, or pain around your biopsy site.    You have fluid or blood coming from your biopsy site.    Your biopsy site feels warm to the touch.    You have pus or a bad smell coming from your biopsy site.    You have blood in your urine more than 24 hours after your procedure.     Get help right away if:    Your urine is dark  red or brown.    You have a fever.    You are not able to urinate.    You feel burning when you urinate.    You feel dizzy or light-headed.    You have severe pain in your abdomen or side.     Summary    After the procedure, it is common to have pain or soreness at the biopsy site and pink or cloudy urine for the first 24 hours.    Check your biopsy site each day for signs of infection, such as more redness, swelling, or pain; fluid, blood, pus or a bad smell coming from the biopsy site; or the biopsy site feeling warm to the touch.      Return to your normal activities as told by your health care provider.        What to Expect Post Sedation    Rest and take it easy for the first 24 hours.  A responsible adult is recommended to remain with you during that time.  It is normal to feel sleepy.  We encourage you to not do anything that requires balance, judgment or coordination.    FOR 24 HOURS DO NOT:  Drive, operate machinery or run household appliances.  Drink beer or alcoholic beverages.  Make important decisions or sign legal documents.    To avoid nausea, slowly advance diet as tolerated, avoiding spicy or greasy foods for the first day.  Add more substantial food to your diet according to your provider's instructions.  INCREASE FLUIDS AND FIBER TO AVOID CONSTIPATION.    MILD FLU-LIKE SYMPTOMS ARE NORMAL.  YOU MAY EXPERIENCE GENERALIZED MUSCLE ACHES, THROAT IRRITATION, HEADACHE AND/OR SOME NAUSEA.  If any questions arise, call your provider.  If your provider is not available, please feel free to call the [facility] at [phone].    MEDICATIONS: Resume taking daily medication.  Take prescribed pain medication with food.  If no medication is prescribed, you may take non-aspirin pain medication if needed.  PAIN MEDICATION CAN BE VERY CONSTIPATING.  Take a stool softener or laxative such as senokot, pericolace, or milk of magnesia if needed.    Diet    Resume your normal diet as tolerated.  A diet low in  cholesterol, fat, and sodium is recommended for good health.       BOWEL FUNCTION:  If you are having problems, use what you normally would or call your provider for suggestions. It also helps to stay regular by including fiber in your diet (for example: bran or fruits and vegetables) and drink plenty of liquids (water, juice, etc.).

## 2025-04-04 NOTE — PROGRESS NOTES
Pt arrived at 0833. RN received report from IR RN. L flank incision site CDI, soft and tender, covered with guaze and tegaderm. Pt tolerated PO fluids and food. Pt's , Colin, at bedside.   1027: RN went over d/c instructions with pt and family at bedside. All questions/concerns answered. Pt ambulated without assistance.   1034: Pt d/c. RN removed IV and ID band.

## 2025-04-04 NOTE — OR SURGEON
Immediate Post- Operative Note        Findings: CKD      Procedure(s): CT guided renal biopsy.      Estimated Blood Loss: Less than 5 ml        Complications: None            4/4/2025     0832 AM     Jak Castro M.D.

## 2025-04-08 ENCOUNTER — HOSPITAL ENCOUNTER (OUTPATIENT)
Dept: LAB | Facility: MEDICAL CENTER | Age: 80
End: 2025-04-08
Attending: INTERNAL MEDICINE
Payer: MEDICARE

## 2025-04-08 LAB
ALBUMIN SERPL BCP-MCNC: 4.2 G/DL (ref 3.2–4.9)
ALBUMIN/GLOB SERPL: 1.8 G/DL
ALP SERPL-CCNC: 53 U/L (ref 30–99)
ALT SERPL-CCNC: 9 U/L (ref 2–50)
ANION GAP SERPL CALC-SCNC: 12 MMOL/L (ref 7–16)
APPEARANCE UR: CLEAR
AST SERPL-CCNC: 16 U/L (ref 12–45)
BACTERIA #/AREA URNS HPF: NORMAL /HPF
BASOPHILS # BLD AUTO: 0.4 % (ref 0–1.8)
BASOPHILS # BLD: 0.03 K/UL (ref 0–0.12)
BILIRUB SERPL-MCNC: 0.8 MG/DL (ref 0.1–1.5)
BILIRUB UR QL STRIP.AUTO: NEGATIVE
BUN SERPL-MCNC: 33 MG/DL (ref 8–22)
CALCIUM ALBUM COR SERPL-MCNC: 9.4 MG/DL (ref 8.5–10.5)
CALCIUM SERPL-MCNC: 9.6 MG/DL (ref 8.5–10.5)
CASTS URNS QL MICRO: NORMAL /LPF (ref 0–2)
CHLORIDE SERPL-SCNC: 104 MMOL/L (ref 96–112)
CO2 SERPL-SCNC: 19 MMOL/L (ref 20–33)
COLOR UR: YELLOW
CREAT SERPL-MCNC: 1.8 MG/DL (ref 0.5–1.4)
EOSINOPHIL # BLD AUTO: 0.24 K/UL (ref 0–0.51)
EOSINOPHIL NFR BLD: 3.4 % (ref 0–6.9)
EPITHELIAL CELLS 1715: NORMAL /HPF (ref 0–5)
ERYTHROCYTE [DISTWIDTH] IN BLOOD BY AUTOMATED COUNT: 47.7 FL (ref 35.9–50)
GFR SERPLBLD CREATININE-BSD FMLA CKD-EPI: 28 ML/MIN/1.73 M 2
GLOBULIN SER CALC-MCNC: 2.3 G/DL (ref 1.9–3.5)
GLUCOSE SERPL-MCNC: 92 MG/DL (ref 65–99)
GLUCOSE UR STRIP.AUTO-MCNC: NEGATIVE MG/DL
HCT VFR BLD AUTO: 34.4 % (ref 37–47)
HGB BLD-MCNC: 11.6 G/DL (ref 12–16)
IMM GRANULOCYTES # BLD AUTO: 0.03 K/UL (ref 0–0.11)
IMM GRANULOCYTES NFR BLD AUTO: 0.4 % (ref 0–0.9)
KETONES UR STRIP.AUTO-MCNC: NEGATIVE MG/DL
LEUKOCYTE ESTERASE UR QL STRIP.AUTO: ABNORMAL
LYMPHOCYTES # BLD AUTO: 1.13 K/UL (ref 1–4.8)
LYMPHOCYTES NFR BLD: 16.1 % (ref 22–41)
MCH RBC QN AUTO: 31.3 PG (ref 27–33)
MCHC RBC AUTO-ENTMCNC: 33.7 G/DL (ref 32.2–35.5)
MCV RBC AUTO: 92.7 FL (ref 81.4–97.8)
MICRO URNS: ABNORMAL
MONOCYTES # BLD AUTO: 0.66 K/UL (ref 0–0.85)
MONOCYTES NFR BLD AUTO: 9.4 % (ref 0–13.4)
NEUTROPHILS # BLD AUTO: 4.93 K/UL (ref 1.82–7.42)
NEUTROPHILS NFR BLD: 70.3 % (ref 44–72)
NITRITE UR QL STRIP.AUTO: NEGATIVE
NRBC # BLD AUTO: 0 K/UL
NRBC BLD-RTO: 0 /100 WBC (ref 0–0.2)
PATHOLOGY CONSULT NOTE: NORMAL
PH UR STRIP.AUTO: 6.5 [PH] (ref 5–8)
PLATELET # BLD AUTO: 175 K/UL (ref 164–446)
PMV BLD AUTO: 11.4 FL (ref 9–12.9)
POTASSIUM SERPL-SCNC: 4.6 MMOL/L (ref 3.6–5.5)
PROT SERPL-MCNC: 6.5 G/DL (ref 6–8.2)
PROT UR QL STRIP: 100 MG/DL
RBC # BLD AUTO: 3.71 M/UL (ref 4.2–5.4)
RBC # URNS HPF: NORMAL /HPF (ref 0–2)
RBC UR QL AUTO: NEGATIVE
SODIUM SERPL-SCNC: 135 MMOL/L (ref 135–145)
SP GR UR STRIP.AUTO: 1.01
UROBILINOGEN UR STRIP.AUTO-MCNC: 1 EU/DL
WBC # BLD AUTO: 7 K/UL (ref 4.8–10.8)
WBC #/AREA URNS HPF: NORMAL /HPF

## 2025-04-08 PROCEDURE — 36415 COLL VENOUS BLD VENIPUNCTURE: CPT

## 2025-04-08 PROCEDURE — 80053 COMPREHEN METABOLIC PANEL: CPT

## 2025-04-08 PROCEDURE — 82043 UR ALBUMIN QUANTITATIVE: CPT

## 2025-04-08 PROCEDURE — 82570 ASSAY OF URINE CREATININE: CPT | Mod: 91

## 2025-04-08 PROCEDURE — 84156 ASSAY OF PROTEIN URINE: CPT

## 2025-04-08 PROCEDURE — 85025 COMPLETE CBC W/AUTO DIFF WBC: CPT

## 2025-04-08 PROCEDURE — 81001 URINALYSIS AUTO W/SCOPE: CPT

## 2025-04-09 LAB
CREAT UR-MCNC: 89 MG/DL
CREAT UR-MCNC: 89.3 MG/DL
MICROALBUMIN UR-MCNC: 26.7 MG/DL
MICROALBUMIN/CREAT UR: 300 MG/G (ref 0–30)
PROT UR-MCNC: 44.4 MG/DL (ref 0–15)
PROT/CREAT UR: 497 MG/G (ref 10–107)

## 2025-05-14 ENCOUNTER — OFFICE VISIT (OUTPATIENT)
Dept: MEDICAL GROUP | Age: 80
End: 2025-05-14
Payer: MEDICARE

## 2025-05-14 VITALS
WEIGHT: 170.3 LBS | DIASTOLIC BLOOD PRESSURE: 74 MMHG | SYSTOLIC BLOOD PRESSURE: 136 MMHG | OXYGEN SATURATION: 98 % | BODY MASS INDEX: 29.08 KG/M2 | HEART RATE: 74 BPM | HEIGHT: 64 IN | TEMPERATURE: 98 F

## 2025-05-14 DIAGNOSIS — E83.110 HEREDITARY HEMOCHROMATOSIS (HCC): ICD-10-CM

## 2025-05-14 DIAGNOSIS — I10 PRIMARY HYPERTENSION: ICD-10-CM

## 2025-05-14 DIAGNOSIS — N18.4 STAGE 4 CHRONIC KIDNEY DISEASE (HCC): ICD-10-CM

## 2025-05-14 DIAGNOSIS — I12.9 HYPERTENSIVE CHRONIC KIDNEY DISEASE WITH STAGE 1 THROUGH STAGE 4 CHRONIC KIDNEY DISEASE, OR UNSPECIFIED CHRONIC KIDNEY DISEASE: Primary | ICD-10-CM

## 2025-05-14 PROCEDURE — 3075F SYST BP GE 130 - 139MM HG: CPT | Performed by: STUDENT IN AN ORGANIZED HEALTH CARE EDUCATION/TRAINING PROGRAM

## 2025-05-14 PROCEDURE — 99214 OFFICE O/P EST MOD 30 MIN: CPT | Performed by: STUDENT IN AN ORGANIZED HEALTH CARE EDUCATION/TRAINING PROGRAM

## 2025-05-14 PROCEDURE — 3078F DIAST BP <80 MM HG: CPT | Performed by: STUDENT IN AN ORGANIZED HEALTH CARE EDUCATION/TRAINING PROGRAM

## 2025-05-14 ASSESSMENT — FIBROSIS 4 INDEX: FIB4 SCORE: 2.47

## 2025-05-14 NOTE — PROGRESS NOTES
Verbal consent was acquired by the patient to use [employer] ambient listening note generation during this visit     Subjective:     HPI:   History of Present Illness  The patient is an 79-year-old female who presents for follow-up.    She has been under nephrological care since the beginning of the year due to a longstanding kidney disorder, which she attributes to previous arthritis medication. Her GFR in December 2024 was 35, indicative of stage 3B kidney disease, a condition she has managed for 25 years. However, her GFR unexpectedly declined to 27 and then 21. Despite maintaining a consistent diet, the cause of this decline remains unknown. A biopsy conducted a month ago yielded negative results. Her most recent GFR was 28, classifying her as stage 4. She is scheduled to see her nephrologist in mid-July 2025 for further blood tests and urinalysis. She reports no changes in her diet or lifestyle that could have precipitated the decline in her GFR. She is currently on amlodipine 5 mg, valsartan 320 mg, and furosemide 20 mg.    She also reports a decrease in endurance and increased fatigue, which she attributes to recent medication changes. She is attempting to lose weight through increased physical activity, including walking. She has been experiencing frequent urination, approximately 50 times daily, which she initially did not associate with her medication, furosemide.    She developed a rash in the genital area and under her arms, which she initially suspected was a side effect of a new medication. However, it was determined that the rash was likely due to increased moisture excretion. The rash consists of small bumps and is accompanied by a burning sensation during urination. She was advised to discontinue calcium citrate for a week to assess its impact on the itching, which she has done for the past two days without noticeable improvement.    She has hemochromatosis, managed by regular blood donations every  "three months, the last of which was on 11/06/2024. She has not donated blood since then due to low hemoglobin levels. She has biannual appointments with her internist for management of her hemochromatosis.    No n/v/d/c, fever, chills, sob, chest pain      Objective:     Exam:  /74 (BP Location: Left arm, Patient Position: Sitting, BP Cuff Size: Adult)   Pulse 74   Temp 36.7 °C (98 °F) (Temporal)   Ht 1.626 m (5' 4\")   Wt 77.2 kg (170 lb 4.8 oz)   SpO2 98%   BMI 29.23 kg/m²  Body mass index is 29.23 kg/m².    Physical Exam  Gen: nad  HENT: ncat, EOMI  Resp: ctabl  Cardiac: rrr, no m  GI: nt/nd  Neuro: no focal deficits, cn 2-12 intact throughout, sensation to light touch intact throughout  Psych: appropriate mood and affect      Results  Labs   - GFR: 12/2024, 35   - GFR: 28 in 4/2025   - Red Blood Count: slightly Below normal   - Hematocrit: slightly Below normal   - Hemoglobin: slightly Below normal    Assessment & Plan:     1. Hypertensive chronic kidney disease with stage 1 through stage 4 chronic kidney disease, or unspecified chronic kidney disease        2. Stage 4 chronic kidney disease (HCC)        3. Hereditary hemochromatosis (HCC)        4. Primary hypertension            Assessment & Plan  1. Chronic Kidney Disease Stage IV.  I reviewed recent notes from nephrologist.  - GFR fluctuated from 35 in 12/2024 to 21 and then back up to 28.  - Decline in kidney function may be contributing to low red blood cell count, hematocrit, and hemoglobin levels.  - Advised to maintain adequate hydration and increase physical activity gradually.  - Follow-up with nephrologist in mid-July for another set of blood tests and urinalysis.    2. Rash.  - Developed a rash between legs and genital area and under arms, likely due to increased moisture and not drying adequately.  - Advised to use powder to keep the areas dry.  - Stopped taking calcium citrate for a week to see if it alleviates the itching.  - Rash " described as small bumps with external burning sensation during urination.    3. HTN  - Currently on amlodipine 5 mg, valsartan 320 mg, and furosemide 20 mg.  - Furosemide caused increased urination, which was not initially recognized due to its alternative name, Lasix.  - Will continue these medications as prescribed.  - Prescription Drug Monitoring Program was reviewed.    4. Hemochromatosis.  - History of hemochromatosis, previously managed by donating blood every three months.  - Stopped donating blood in 11/2024 due to a drop in hemoglobin levels.  - Follow-up with internist in July for further management.    Follow-up  - Follow-up appointment scheduled for August.          Return in about 3 months (around 8/14/2025) for check up .    Please note that this dictation was created using voice recognition software. I have made every reasonable attempt to correct obvious errors, but I expect that there are errors of grammar and possibly content that I did not discover before finalizing the note.

## 2025-06-10 ENCOUNTER — OFFICE VISIT (OUTPATIENT)
Dept: URGENT CARE | Facility: CLINIC | Age: 80
End: 2025-06-10
Payer: MEDICARE

## 2025-06-10 VITALS
SYSTOLIC BLOOD PRESSURE: 132 MMHG | BODY MASS INDEX: 30.05 KG/M2 | DIASTOLIC BLOOD PRESSURE: 82 MMHG | WEIGHT: 176 LBS | RESPIRATION RATE: 19 BRPM | HEART RATE: 75 BPM | HEIGHT: 64 IN | OXYGEN SATURATION: 97 % | TEMPERATURE: 97.8 F

## 2025-06-10 DIAGNOSIS — R19.5 LOOSE STOOLS: Primary | ICD-10-CM

## 2025-06-10 PROCEDURE — 3079F DIAST BP 80-89 MM HG: CPT | Performed by: NURSE PRACTITIONER

## 2025-06-10 PROCEDURE — 3075F SYST BP GE 130 - 139MM HG: CPT | Performed by: NURSE PRACTITIONER

## 2025-06-10 PROCEDURE — 99214 OFFICE O/P EST MOD 30 MIN: CPT | Performed by: NURSE PRACTITIONER

## 2025-06-10 ASSESSMENT — ENCOUNTER SYMPTOMS
MYALGIAS: 0
BLOOD IN STOOL: 0
CHILLS: 0
WEIGHT LOSS: 0
VOMITING: 0
FLANK PAIN: 0
FEVER: 0
DIZZINESS: 0
CONSTIPATION: 0
ABDOMINAL PAIN: 0
HEADACHES: 0
DIARRHEA: 1
NAUSEA: 0
HEARTBURN: 0

## 2025-06-10 ASSESSMENT — LIFESTYLE VARIABLES: SUBSTANCE_ABUSE: 1

## 2025-06-10 ASSESSMENT — FIBROSIS 4 INDEX: FIB4 SCORE: 2.47

## 2025-06-10 NOTE — PROGRESS NOTES
"Berta Cohen is a 79 y.o. female who presents for Diarrhea (3 months)      HPI  This is a new problem. Berta Cohen is a 79 y.o. patient who presents to urgent care with c/o: loose stools every time she goes to the bathroom to urinate.  Has not had a normal bowel movement for several months. She thought it would just go away. Has not taken any medication to slow down stool. Denies abd cramping, nausea, blood in stool.   Colonoscopy 10 years ago - said she does not need any more.   Taking magnesium for years - from her kidney doctor.    She is also taking flax oil.   No recent antibiotics.   No exposure to ill persons.   No recent travel    Review of Systems   Constitutional:  Negative for chills, fever, malaise/fatigue and weight loss.   Gastrointestinal:  Positive for diarrhea. Negative for abdominal pain, blood in stool, constipation, heartburn, melena, nausea and vomiting.   Genitourinary:  Positive for frequency (on lasix). Negative for dysuria, flank pain, hematuria and urgency.   Musculoskeletal:  Negative for myalgias.   Neurological:  Negative for dizziness and headaches.   Psychiatric/Behavioral:  Positive for substance abuse.     See HPI    Allergies:     Allergies[1]    PMSFS Hx:  Past Medical History[2]  Past Surgical History[3]  Family History   Problem Relation Age of Onset    Heart Disease Mother     Kidney Disease Mother     Other Father         aortic aneurysm rupture age 73.    Arrythmia Brother     Cancer Neg Hx      Social History     Tobacco Use    Smoking status: Never    Smokeless tobacco: Never   Substance Use Topics    Alcohol use: Not Currently     Comment: haven't drank in years, very rare         Problems:   Problem List[4]    Medications:   Medications Ordered Prior to Encounter[5]     Objective:     /82   Pulse 75   Temp 36.6 °C (97.8 °F) (Temporal)   Resp 19   Ht 1.626 m (5' 4\")   Wt 79.8 kg (176 lb)   SpO2 97%   BMI 30.21 kg/m²     Physical Exam  Vitals " and nursing note reviewed.   Constitutional:       Appearance: Normal appearance.   HENT:      Mouth/Throat:      Mouth: Mucous membranes are moist.   Cardiovascular:      Rate and Rhythm: Normal rate.      Pulses: Normal pulses.   Pulmonary:      Effort: Pulmonary effort is normal.   Skin:     General: Skin is warm.      Capillary Refill: Capillary refill takes less than 2 seconds.   Neurological:      Mental Status: She is alert and oriented to person, place, and time.   Psychiatric:         Mood and Affect: Mood normal.         Behavior: Behavior normal.         Assessment /Associated Orders:      1. Loose stools  CULTURE STOOL            Medical Decision Making:    Berta Cohen is a very pleasant 79 y.o. female who is clinically stable at today's acute urgent care visit. Presents with acute problem/ concern today.    No acute distress is noted at the time of the visit.  VSS. Appropriate for outpatient care at this time.   Currently having loose stools every time she urinates.  She denies abdominal pain, nausea, dysuria, recent travel, recent antibiotics, recent changes in medications.  There is no blood in her urine or her stool.  She had a colonoscopy 10 years ago that was normal by GI consultants.  No red flags on exam today.   Hold magnesium for 1 week to see improvement   After that she can hold Flax oil for 1 week.   Stool Culture: pending   Probiotics daily recommended   OTC imodium AD if symptoms worsen   Through shared decision making a discussion of the Dx and DDx, management options (risks,benefits, and alternatives to planned treatment), natural progression, supportive care and indications for immediate follow-up discussed. Expressed understanding and the treatment plan was agreed upon.    Questions were encouraged and answered     Follow Up:   Return to urgent care prn if new or worsening sx or if there is no improvement in condition prn.    Educated in Red flags and indications to  immediately call 911 or present to the Emergency Department.   Bloody stools, abd pain, incontinence of stool.       Time I spent evaluating Berta Cohen in urgent care today was 30  minutes. This time includes preparing for visit, reviewing any pertinent notes or test results, exam, obtaining HPI, interpretation of lab tests,counseling/education, medication management ( RX and/ or OTC)  and documentation as indicated above.Time does not include separately billable procedures if noted .       Please note that this dictation was created using voice recognition software. I have worked with consultants from the vendor as well as technical experts from [facility] to optimize the interface. I have made every reasonable attempt to correct obvious errors, but I expect that there are errors of grammar and possibly content that I did not discover before finalizing the note.  This note was electronically signed by provider           [1]   Allergies  Allergen Reactions    Atorvastatin Myalgia    Carvedilol Shortness of Breath     Weak, tired, short of breath    Hmg-Coa-R Inhibitors Myalgia     AKA statins    Rosuvastatin Calcium Myalgia    Sodium Bicarbonate Vomiting    Ipratropium Unspecified     Ineffective, no reaction    Neomycin-Bacitracin-Polymyxin Rash     After 5 days of use,     Neosporin [Neomycin-Polymyxin B] Rash     Per pt cant use for more than 5 days or gets red rash.   [2]   Past Medical History:  Diagnosis Date    Anemia 2025    mild anemia    Arthritis     Depression 2025    not taking any meds for depression    Heart burn     Hemochromatosis, hereditary (HCC)     High cholesterol     Hypertension     Indigestion     Loose stools 2025 2025, pt states this is from change in HTN meds    Rash 2025    vaginal rash, back of ears, under arm pits. mild, resolving as of 3/31/25    Stage 3b chronic kidney disease 09/11/2017    Vasomotor rhinitis     nose runs with change of temperature hot to cold,  weather changes   [3]   Past Surgical History:  Procedure Laterality Date    OTHER ORTHOPEDIC SURGERY  2014    R big toe joint replacement and right second toe    GYN SURGERY      tubal ligation    OTHER ABDOMINAL SURGERY      Burst appendix in 1970s    TONSILLECTOMY     [4]   Patient Active Problem List  Diagnosis    Lower urinary tract infectious disease    Vertigo    Intractable nausea and vomiting    Primary hypertension    Emesis    Insomnia    Stage 4 chronic kidney disease (HCC)    Left-sided thoracic back pain    Lung nodule < 6cm on CT    Thyroid nodule    Dyslipidemia    Hereditary hemochromatosis (HCC)    Coronary artery calcification seen on CAT scan    Statin intolerance    History of fall    Unspecified osteoarthritis, unspecified site    Secondary hyperparathyroidism of renal origin (HCC)    Hypertensive chronic kidney disease with stage 1 through stage 4 chronic kidney disease, or unspecified chronic kidney disease    Obesity   [5]   Current Outpatient Medications on File Prior to Visit   Medication Sig Dispense Refill    diphenhydrAMINE-APAP, sleep, (TYLENOL PM EXTRA STRENGTH)  MG Tab Take 1 Tablet by mouth at bedtime.      omeprazole (PRILOSEC) 20 MG delayed-release capsule Take 20 mg by mouth every day.      Cholecalciferol (VITAMIN D3) 2000 UNIT Cap Take  by mouth every day.      Turmeric 500 MG Cap Take 500 mg by mouth every day.      CALCIUM CITRATE PO Take 600 mg by mouth every day.      Magnesium 100 MG Tab Take 100 mg by mouth.      NON SPECIFIED Take  by mouth every day. PRESOR VISION      furosemide (LASIX) 20 MG Tab Take 20 mg by mouth every day.      valsartan (DIOVAN) 320 MG tablet Take 320 mg by mouth every day.      amLODIPine (NORVASC) 5 MG Tab Take 5 mg by mouth 2 times a day.      Flax Oil Take 1 Capsule by mouth every day.      estradiol (ESTRACE) 0.5 MG tablet Take 0.5 mg by mouth every 48 hours.      medroxyPROGESTERone (PROVERA) 2.5 MG Tab Take 2.5 mg by mouth every day.        No current facility-administered medications on file prior to visit.

## 2025-06-11 ENCOUNTER — HOSPITAL ENCOUNTER (OUTPATIENT)
Facility: MEDICAL CENTER | Age: 80
End: 2025-06-11
Attending: NURSE PRACTITIONER
Payer: MEDICARE

## 2025-06-11 DIAGNOSIS — R19.5 LOOSE STOOLS: ICD-10-CM

## 2025-06-11 PROCEDURE — 87045 FECES CULTURE AEROBIC BACT: CPT

## 2025-06-11 PROCEDURE — 87899 AGENT NOS ASSAY W/OPTIC: CPT

## 2025-06-11 PROCEDURE — 87077 CULTURE AEROBIC IDENTIFY: CPT

## 2025-06-11 PROCEDURE — 87046 STOOL CULTR AEROBIC BACT EA: CPT

## 2025-06-12 LAB
E COLI SXT1+2 STL IA: NORMAL
SIGNIFICANT IND 70042: NORMAL
SITE SITE: NORMAL
SOURCE SOURCE: NORMAL

## 2025-06-13 ENCOUNTER — RESULTS FOLLOW-UP (OUTPATIENT)
Dept: RADIOLOGY | Facility: MEDICAL CENTER | Age: 80
End: 2025-06-13

## 2025-06-14 LAB
BACTERIA STL CULT: NORMAL
E COLI SXT1+2 STL IA: NORMAL
SIGNIFICANT IND 70042: NORMAL
SITE SITE: NORMAL
SOURCE SOURCE: NORMAL

## 2025-07-07 ENCOUNTER — HOSPITAL ENCOUNTER (OUTPATIENT)
Dept: LAB | Facility: MEDICAL CENTER | Age: 80
End: 2025-07-07
Attending: NURSE PRACTITIONER
Payer: MEDICARE

## 2025-07-07 LAB
25(OH)D3 SERPL-MCNC: 59 NG/ML (ref 30–100)
ALBUMIN SERPL BCP-MCNC: 4.4 G/DL (ref 3.2–4.9)
ALBUMIN/GLOB SERPL: 1.9 G/DL
ALP SERPL-CCNC: 53 U/L (ref 30–99)
ALT SERPL-CCNC: 11 U/L (ref 2–50)
ANION GAP SERPL CALC-SCNC: 14 MMOL/L (ref 7–16)
APPEARANCE UR: ABNORMAL
AST SERPL-CCNC: 18 U/L (ref 12–45)
BACTERIA #/AREA URNS HPF: ABNORMAL /HPF
BASOPHILS # BLD AUTO: 0.6 % (ref 0–1.8)
BASOPHILS # BLD: 0.04 K/UL (ref 0–0.12)
BILIRUB SERPL-MCNC: 0.4 MG/DL (ref 0.1–1.5)
BILIRUB UR QL STRIP.AUTO: NEGATIVE
BUN SERPL-MCNC: 42 MG/DL (ref 8–22)
CALCIUM ALBUM COR SERPL-MCNC: 9.2 MG/DL (ref 8.5–10.5)
CALCIUM SERPL-MCNC: 9.5 MG/DL (ref 8.5–10.5)
CASTS URNS QL MICRO: ABNORMAL /LPF (ref 0–2)
CHLORIDE SERPL-SCNC: 104 MMOL/L (ref 96–112)
CO2 SERPL-SCNC: 18 MMOL/L (ref 20–33)
COLOR UR: YELLOW
CREAT SERPL-MCNC: 2.16 MG/DL (ref 0.5–1.4)
CREAT UR-MCNC: 140 MG/DL
EOSINOPHIL # BLD AUTO: 0.23 K/UL (ref 0–0.51)
EOSINOPHIL NFR BLD: 3.7 % (ref 0–6.9)
EPITHELIAL CELLS 1715: ABNORMAL /HPF (ref 0–5)
ERYTHROCYTE [DISTWIDTH] IN BLOOD BY AUTOMATED COUNT: 47.9 FL (ref 35.9–50)
FERRITIN SERPL-MCNC: 41.7 NG/ML (ref 10–291)
GFR SERPLBLD CREATININE-BSD FMLA CKD-EPI: 22 ML/MIN/1.73 M 2
GLOBULIN SER CALC-MCNC: 2.3 G/DL (ref 1.9–3.5)
GLUCOSE SERPL-MCNC: 92 MG/DL (ref 65–99)
GLUCOSE UR STRIP.AUTO-MCNC: NEGATIVE MG/DL
HCT VFR BLD AUTO: 37.4 % (ref 37–47)
HGB BLD-MCNC: 11.9 G/DL (ref 12–16)
IMM GRANULOCYTES # BLD AUTO: 0.05 K/UL (ref 0–0.11)
IMM GRANULOCYTES NFR BLD AUTO: 0.8 % (ref 0–0.9)
IRON SATN MFR SERPL: 40 % (ref 15–55)
IRON SERPL-MCNC: 102 UG/DL (ref 40–170)
KETONES UR STRIP.AUTO-MCNC: NEGATIVE MG/DL
LEUKOCYTE ESTERASE UR QL STRIP.AUTO: ABNORMAL
LYMPHOCYTES # BLD AUTO: 1.26 K/UL (ref 1–4.8)
LYMPHOCYTES NFR BLD: 20 % (ref 22–41)
MAGNESIUM SERPL-MCNC: 2.2 MG/DL (ref 1.5–2.5)
MCH RBC QN AUTO: 31.1 PG (ref 27–33)
MCHC RBC AUTO-ENTMCNC: 31.8 G/DL (ref 32.2–35.5)
MCV RBC AUTO: 97.7 FL (ref 81.4–97.8)
MICRO URNS: ABNORMAL
MONOCYTES # BLD AUTO: 0.59 K/UL (ref 0–0.85)
MONOCYTES NFR BLD AUTO: 9.4 % (ref 0–13.4)
NEUTROPHILS # BLD AUTO: 4.12 K/UL (ref 1.82–7.42)
NEUTROPHILS NFR BLD: 65.5 % (ref 44–72)
NITRITE UR QL STRIP.AUTO: NEGATIVE
NRBC # BLD AUTO: 0 K/UL
NRBC BLD-RTO: 0 /100 WBC (ref 0–0.2)
PH UR STRIP.AUTO: 6 [PH] (ref 5–8)
PLATELET # BLD AUTO: 208 K/UL (ref 164–446)
PMV BLD AUTO: 11.7 FL (ref 9–12.9)
POTASSIUM SERPL-SCNC: 4.5 MMOL/L (ref 3.6–5.5)
PROT SERPL-MCNC: 6.7 G/DL (ref 6–8.2)
PROT UR QL STRIP: 100 MG/DL
PROT UR-MCNC: 54.6 MG/DL (ref 0–15)
PROT/CREAT UR: 390 MG/G (ref 10–107)
PTH-INTACT SERPL-MCNC: 45.6 PG/ML (ref 14–72)
RBC # BLD AUTO: 3.83 M/UL (ref 4.2–5.4)
RBC # URNS HPF: ABNORMAL /HPF (ref 0–2)
RBC UR QL AUTO: NEGATIVE
SODIUM SERPL-SCNC: 136 MMOL/L (ref 135–145)
SP GR UR STRIP.AUTO: 1.02
TIBC SERPL-MCNC: 257 UG/DL (ref 250–450)
TRANS CELLS URNS QL MICRO: PRESENT /HPF
UIBC SERPL-MCNC: 155 UG/DL (ref 110–370)
URATE SERPL-MCNC: 7.3 MG/DL (ref 1.9–8.2)
UROBILINOGEN UR STRIP.AUTO-MCNC: 0.2 EU/DL
WBC # BLD AUTO: 6.3 K/UL (ref 4.8–10.8)
WBC #/AREA URNS HPF: ABNORMAL /HPF

## 2025-07-07 PROCEDURE — 83735 ASSAY OF MAGNESIUM: CPT

## 2025-07-07 PROCEDURE — 36415 COLL VENOUS BLD VENIPUNCTURE: CPT

## 2025-07-07 PROCEDURE — 83550 IRON BINDING TEST: CPT

## 2025-07-07 PROCEDURE — 82570 ASSAY OF URINE CREATININE: CPT

## 2025-07-07 PROCEDURE — 83540 ASSAY OF IRON: CPT

## 2025-07-07 PROCEDURE — 84550 ASSAY OF BLOOD/URIC ACID: CPT

## 2025-07-07 PROCEDURE — 81001 URINALYSIS AUTO W/SCOPE: CPT

## 2025-07-07 PROCEDURE — 82306 VITAMIN D 25 HYDROXY: CPT

## 2025-07-07 PROCEDURE — 85025 COMPLETE CBC W/AUTO DIFF WBC: CPT

## 2025-07-07 PROCEDURE — 84156 ASSAY OF PROTEIN URINE: CPT

## 2025-07-07 PROCEDURE — 83970 ASSAY OF PARATHORMONE: CPT

## 2025-07-07 PROCEDURE — 80053 COMPREHEN METABOLIC PANEL: CPT

## 2025-07-07 PROCEDURE — 82728 ASSAY OF FERRITIN: CPT

## 2025-07-07 PROCEDURE — 82043 UR ALBUMIN QUANTITATIVE: CPT

## 2025-07-09 LAB
COLLECT DURATION TIME SPEC: ABNORMAL HR
CREAT 24H UR-MCNC: 140 MG/DL
MICROALBUMIN 24H UR-MCNC: 33.2 MG/DL
MICROALBUMIN/CREAT 24H UR: 237 MG/G (ref 0–30)
SPECIMEN VOL ?TM UR: ABNORMAL ML

## 2025-07-29 ENCOUNTER — OFFICE VISIT (OUTPATIENT)
Dept: MEDICAL GROUP | Age: 80
End: 2025-07-29
Payer: MEDICARE

## 2025-07-29 VITALS
HEIGHT: 64 IN | OXYGEN SATURATION: 98 % | SYSTOLIC BLOOD PRESSURE: 148 MMHG | HEART RATE: 70 BPM | WEIGHT: 176 LBS | BODY MASS INDEX: 30.05 KG/M2 | DIASTOLIC BLOOD PRESSURE: 60 MMHG | TEMPERATURE: 97 F

## 2025-07-29 DIAGNOSIS — N18.4 STAGE 4 CHRONIC KIDNEY DISEASE (HCC): ICD-10-CM

## 2025-07-29 DIAGNOSIS — I10 PRIMARY HYPERTENSION: Primary | ICD-10-CM

## 2025-07-29 DIAGNOSIS — E83.110 HEREDITARY HEMOCHROMATOSIS (HCC): ICD-10-CM

## 2025-07-29 ASSESSMENT — FIBROSIS 4 INDEX: FIB4 SCORE: 2.11

## 2025-07-29 NOTE — PROGRESS NOTES
Verbal consent was acquired by the patient to use [employer] ambient listening note generation during this visit     Subjective:     HPI:   History of Present Illness  The patient is an 79-year-old female who presents for a follow-up visit.    The primary reason for this visit is to follow up on her kidney condition and address recent gastrointestinal issues. She has been experiencing diarrhea, which she initially attributed to a possible infection. A toxicology screen at a walk-in clinic revealed no abnormalities. She suspects that her furosemide medication may be the cause, as discontinuing it on 07/11/2025 alleviated the diarrhea. However, her bowel movements remain irregular, with three small movements in the morning instead of her usual one. She does not experience constipation or blood in her stool. She had diarrhea intermittently for about 6 weeks, but it has resolved since stopping the medication and magnesium and eating yogurt. She maintains good hydration, consuming at least 2 liters of fluids daily. She was advised by a nurse practitioner to stop taking over-the-counter magnesium supplements and to incorporate yogurt into her diet, both of which she has done. Her diet includes a good amount of fiber, although she has reduced her fruit intake due to the diarrhea. She consumes raspberries, strawberries, and other berries, and has fruit with breakfast and dinner. She also eats oats for breakfast a couple of times a week.     She has been diagnosed with stage 4 kidney disease, a progression from stage 3 where she had been stable for 20 years. She has not changed her diet and continues to monitor her salt and potassium intake. She has an appointment with her nephrologist in August 2025 for further testing. She underwent a kidney biopsy and scan, both of which were negative. She does not take ibuprofen, naproxen, or aspirin. She believes her kidney failure was caused by medication prescribed for arthritis 20  "years ago. She has been on a kidney diet for over 20 years and has not made any recent changes. She has gained weight and experiences shortness of breath. She used to walk a lot but has not been able to exercise much in the past year. She had a physical exam in November or December 2024.    Her blood pressure has improved since stopping Lasix, but she has noticed an increase in urinary infections due to incontinence. Her systolic blood pressure is high, but her diastolic pressure remains normal.    She has an appointment on 12/06/2025 with her internist for a hemochromatosis review and he also did her colostomy several years back. She had her 6-month tests for hemochromatosis and had a CBC with differential to check her red blood count, hemoglobin, and hematocrit because they have been low, but they were better in the last report than what they were the previous report. Last summer, she fell and twisted her ankle, pulled the tendons in her knee, and also pulled her hamstring. She went to [place] and took x-rays. She was on crutches for 2 weeks.    Hobbies: [place]  Diet: Good amount of fiber, reduced fruit intake due to diarrhea, consumes raspberries, strawberries, other berries, and oats for breakfast        No n/v/d/c, fever, chills, sob, chest pain      Objective:     Exam:  BP (!) 148/60   Pulse 70   Temp 36.1 °C (97 °F)   Ht 1.626 m (5' 4\")   Wt 79.8 kg (176 lb)   SpO2 98%   BMI 30.21 kg/m²  Body mass index is 30.21 kg/m².    Physical Exam  Gen: nad  HENT: ncat, EOMI  Resp: ctabl  Cardiac: rrr, no m  GI: nt/nd  Neuro: no focal deficits, cn 2-12 intact throughout, sensation to light touch intact throughout  Psych: appropriate mood and affect      Results  Labs   - Kidney function tests: Stage IV kidney disease   - Middle ear test for hemochromatosis: Normal   - CBC with differential: 07/11/2025, Red blood count, hemoglobin, and hematocrit improved    Diagnostic Testing   - Kidney biopsy: " Normal    Assessment & Plan:     1. Primary hypertension        2. Hereditary hemochromatosis (HCC)        3. Stage 4 chronic kidney disease (HCC)            Assessment & Plan  1. Diarrhea.  - The patient's diarrhea has resolved after discontinuing furosemide and magnesium supplements and incorporating yogurt into her diet.  - She reports having three small bowel movements in the morning instead of one large one.  - She is advised to maintain a high-fiber diet, including fruits like raspberries, strawberries, apples with skin, broccoli, green leafy vegetables, grains, brown rice, oats, and quinoa.  - She should avoid prunes as they may worsen her condition.    2. Chronic Kidney Disease Stage IV.  I reviewed recent nephrology note and labs  - The patient has progressed from stage III to stage IV chronic kidney disease over the past year.  - Recent labs and tests confirm the worsening condition, with creatinine levels indicating stage IV.  - pt is followed closely by nephrology for CKD and HTN  - avoid all nsaid's and other nephrotoxins. Renally dose all meds    3. HTN  - pt was switched from losartan to valsartan by nephro and is stable.  - meds to be adjusted by nephro  - advised to maintain adequate hydration    4. Hereditary hemochromatosis  -normal ferritin/iron/total iron  -cont following with hematology          Return in about 5 months (around 12/29/2025) for annual visit.    Please note that this dictation was created using voice recognition software. I have made every reasonable attempt to correct obvious errors, but I expect that there are errors of grammar and possibly content that I did not discover before finalizing the note.

## 2025-08-20 ENCOUNTER — HOSPITAL ENCOUNTER (OUTPATIENT)
Dept: LAB | Facility: MEDICAL CENTER | Age: 80
End: 2025-08-20
Attending: INTERNAL MEDICINE
Payer: MEDICARE

## 2025-08-20 LAB
ALBUMIN SERPL BCP-MCNC: 4.2 G/DL (ref 3.2–4.9)
ALBUMIN/GLOB SERPL: 1.8 G/DL
ALP SERPL-CCNC: 55 U/L (ref 30–99)
ALT SERPL-CCNC: 9 U/L (ref 2–50)
ANION GAP SERPL CALC-SCNC: 13 MMOL/L (ref 7–16)
APPEARANCE UR: CLEAR
AST SERPL-CCNC: 16 U/L (ref 12–45)
BACTERIA #/AREA URNS HPF: ABNORMAL /HPF
BASOPHILS # BLD AUTO: 0.5 % (ref 0–1.8)
BASOPHILS # BLD: 0.03 K/UL (ref 0–0.12)
BILIRUB SERPL-MCNC: 0.5 MG/DL (ref 0.1–1.5)
BILIRUB UR QL STRIP.AUTO: NEGATIVE
BUN SERPL-MCNC: 43 MG/DL (ref 8–22)
CALCIUM ALBUM COR SERPL-MCNC: 9.5 MG/DL (ref 8.5–10.5)
CALCIUM SERPL-MCNC: 9.7 MG/DL (ref 8.5–10.5)
CASTS URNS QL MICRO: ABNORMAL /LPF (ref 0–2)
CHLORIDE SERPL-SCNC: 107 MMOL/L (ref 96–112)
CO2 SERPL-SCNC: 16 MMOL/L (ref 20–33)
COLOR UR: YELLOW
CREAT SERPL-MCNC: 1.91 MG/DL (ref 0.5–1.4)
CREAT UR-MCNC: 88.8 MG/DL
CREAT UR-MCNC: 90.9 MG/DL
EOSINOPHIL # BLD AUTO: 0.23 K/UL (ref 0–0.51)
EOSINOPHIL NFR BLD: 3.8 % (ref 0–6.9)
EPITHELIAL CELLS 1715: ABNORMAL /HPF (ref 0–5)
ERYTHROCYTE [DISTWIDTH] IN BLOOD BY AUTOMATED COUNT: 47 FL (ref 35.9–50)
GFR SERPLBLD CREATININE-BSD FMLA CKD-EPI: 26 ML/MIN/1.73 M 2
GLOBULIN SER CALC-MCNC: 2.4 G/DL (ref 1.9–3.5)
GLUCOSE SERPL-MCNC: 91 MG/DL (ref 65–99)
GLUCOSE UR STRIP.AUTO-MCNC: NEGATIVE MG/DL
HCT VFR BLD AUTO: 35.2 % (ref 37–47)
HGB BLD-MCNC: 11.9 G/DL (ref 12–16)
IMM GRANULOCYTES # BLD AUTO: 0.04 K/UL (ref 0–0.11)
IMM GRANULOCYTES NFR BLD AUTO: 0.7 % (ref 0–0.9)
KETONES UR STRIP.AUTO-MCNC: NEGATIVE MG/DL
LEUKOCYTE ESTERASE UR QL STRIP.AUTO: ABNORMAL
LYMPHOCYTES # BLD AUTO: 1.02 K/UL (ref 1–4.8)
LYMPHOCYTES NFR BLD: 16.7 % (ref 22–41)
MCH RBC QN AUTO: 31.7 PG (ref 27–33)
MCHC RBC AUTO-ENTMCNC: 33.8 G/DL (ref 32.2–35.5)
MCV RBC AUTO: 93.9 FL (ref 81.4–97.8)
MICRO URNS: ABNORMAL
MICROALBUMIN UR-MCNC: 38.6 MG/DL
MICROALBUMIN/CREAT UR: 425 MG/G (ref 0–30)
MONOCYTES # BLD AUTO: 0.61 K/UL (ref 0–0.85)
MONOCYTES NFR BLD AUTO: 10 % (ref 0–13.4)
NEUTROPHILS # BLD AUTO: 4.17 K/UL (ref 1.82–7.42)
NEUTROPHILS NFR BLD: 68.3 % (ref 44–72)
NITRITE UR QL STRIP.AUTO: NEGATIVE
NRBC # BLD AUTO: 0 K/UL
NRBC BLD-RTO: 0 /100 WBC (ref 0–0.2)
PH UR STRIP.AUTO: 6 [PH] (ref 5–8)
PHOSPHATE SERPL-MCNC: 3.5 MG/DL (ref 2.5–4.5)
PLATELET # BLD AUTO: 202 K/UL (ref 164–446)
PMV BLD AUTO: 11.7 FL (ref 9–12.9)
POTASSIUM SERPL-SCNC: 5 MMOL/L (ref 3.6–5.5)
PROT SERPL-MCNC: 6.6 G/DL (ref 6–8.2)
PROT UR QL STRIP: 100 MG/DL
PROT UR-MCNC: 61.2 MG/DL (ref 0–15)
RBC # BLD AUTO: 3.75 M/UL (ref 4.2–5.4)
RBC # URNS HPF: ABNORMAL /HPF (ref 0–2)
RBC UR QL AUTO: ABNORMAL
SODIUM SERPL-SCNC: 136 MMOL/L (ref 135–145)
SP GR UR STRIP.AUTO: 1.01
UROBILINOGEN UR STRIP.AUTO-MCNC: 0.2 EU/DL
WBC # BLD AUTO: 6.1 K/UL (ref 4.8–10.8)
WBC #/AREA URNS HPF: ABNORMAL /HPF

## 2025-08-20 PROCEDURE — 80053 COMPREHEN METABOLIC PANEL: CPT

## 2025-08-20 PROCEDURE — 85025 COMPLETE CBC W/AUTO DIFF WBC: CPT

## 2025-08-20 PROCEDURE — 36415 COLL VENOUS BLD VENIPUNCTURE: CPT

## 2025-08-20 PROCEDURE — 84156 ASSAY OF PROTEIN URINE: CPT

## 2025-08-20 PROCEDURE — 84100 ASSAY OF PHOSPHORUS: CPT

## 2025-08-20 PROCEDURE — 82570 ASSAY OF URINE CREATININE: CPT | Mod: 91

## 2025-08-20 PROCEDURE — 81001 URINALYSIS AUTO W/SCOPE: CPT

## 2025-08-20 PROCEDURE — 82043 UR ALBUMIN QUANTITATIVE: CPT
